# Patient Record
Sex: MALE | Race: WHITE
[De-identification: names, ages, dates, MRNs, and addresses within clinical notes are randomized per-mention and may not be internally consistent; named-entity substitution may affect disease eponyms.]

---

## 2018-01-29 ENCOUNTER — HOSPITAL ENCOUNTER (OUTPATIENT)
Dept: HOSPITAL 47 - EC | Age: 48
Setting detail: OBSERVATION
LOS: 1 days | Discharge: HOME | End: 2018-01-30
Attending: HOSPITALIST | Admitting: HOSPITALIST
Payer: COMMERCIAL

## 2018-01-29 VITALS — BODY MASS INDEX: 23.7 KG/M2

## 2018-01-29 VITALS — RESPIRATION RATE: 18 BRPM

## 2018-01-29 DIAGNOSIS — F17.200: ICD-10-CM

## 2018-01-29 DIAGNOSIS — R03.0: ICD-10-CM

## 2018-01-29 DIAGNOSIS — R06.00: ICD-10-CM

## 2018-01-29 DIAGNOSIS — R42: ICD-10-CM

## 2018-01-29 DIAGNOSIS — R00.2: Primary | ICD-10-CM

## 2018-01-29 DIAGNOSIS — Z88.0: ICD-10-CM

## 2018-01-29 DIAGNOSIS — R55: ICD-10-CM

## 2018-01-29 LAB
ALBUMIN SERPL-MCNC: 4.1 G/DL (ref 3.5–5)
ALP SERPL-CCNC: 54 U/L (ref 38–126)
ALT SERPL-CCNC: 28 U/L (ref 21–72)
ANION GAP SERPL CALC-SCNC: 10 MMOL/L
APTT BLD: 26.8 SEC (ref 22–30)
AST SERPL-CCNC: 18 U/L (ref 17–59)
BASOPHILS # BLD AUTO: 0 K/UL (ref 0–0.2)
BASOPHILS NFR BLD AUTO: 1 %
BUN SERPL-SCNC: 22 MG/DL (ref 9–20)
CALCIUM SPEC-MCNC: 10.1 MG/DL (ref 8.4–10.2)
CHLORIDE SERPL-SCNC: 105 MMOL/L (ref 98–107)
CO2 SERPL-SCNC: 27 MMOL/L (ref 22–30)
EOSINOPHIL # BLD AUTO: 0.1 K/UL (ref 0–0.7)
EOSINOPHIL NFR BLD AUTO: 1 %
ERYTHROCYTE [DISTWIDTH] IN BLOOD BY AUTOMATED COUNT: 4.94 M/UL (ref 4.3–5.9)
ERYTHROCYTE [DISTWIDTH] IN BLOOD: 13.8 % (ref 11.5–15.5)
GLUCOSE SERPL-MCNC: 84 MG/DL (ref 74–99)
HCT VFR BLD AUTO: 44.8 % (ref 39–53)
HGB BLD-MCNC: 14.6 GM/DL (ref 13–17.5)
INR PPP: 1.1 (ref ?–1.2)
LYMPHOCYTES # SPEC AUTO: 2.1 K/UL (ref 1–4.8)
LYMPHOCYTES NFR SPEC AUTO: 26 %
MCH RBC QN AUTO: 29.6 PG (ref 25–35)
MCHC RBC AUTO-ENTMCNC: 32.6 G/DL (ref 31–37)
MCV RBC AUTO: 90.7 FL (ref 80–100)
MONOCYTES # BLD AUTO: 0.6 K/UL (ref 0–1)
MONOCYTES NFR BLD AUTO: 7 %
NEUTROPHILS # BLD AUTO: 5 K/UL (ref 1.3–7.7)
NEUTROPHILS NFR BLD AUTO: 63 %
PLATELET # BLD AUTO: 273 K/UL (ref 150–450)
POTASSIUM SERPL-SCNC: 3.9 MMOL/L (ref 3.5–5.1)
PROT SERPL-MCNC: 6.9 G/DL (ref 6.3–8.2)
PT BLD: 10.4 SEC (ref 9–12)
SODIUM SERPL-SCNC: 142 MMOL/L (ref 137–145)
WBC # BLD AUTO: 7.8 K/UL (ref 3.8–10.6)

## 2018-01-29 PROCEDURE — 99285 EMERGENCY DEPT VISIT HI MDM: CPT

## 2018-01-29 PROCEDURE — 82550 ASSAY OF CK (CPK): CPT

## 2018-01-29 PROCEDURE — 85610 PROTHROMBIN TIME: CPT

## 2018-01-29 PROCEDURE — 85730 THROMBOPLASTIN TIME PARTIAL: CPT

## 2018-01-29 PROCEDURE — 96361 HYDRATE IV INFUSION ADD-ON: CPT

## 2018-01-29 PROCEDURE — 96374 THER/PROPH/DIAG INJ IV PUSH: CPT

## 2018-01-29 PROCEDURE — 82553 CREATINE MB FRACTION: CPT

## 2018-01-29 PROCEDURE — 36415 COLL VENOUS BLD VENIPUNCTURE: CPT

## 2018-01-29 PROCEDURE — 85025 COMPLETE CBC W/AUTO DIFF WBC: CPT

## 2018-01-29 PROCEDURE — 71046 X-RAY EXAM CHEST 2 VIEWS: CPT

## 2018-01-29 PROCEDURE — 93017 CV STRESS TEST TRACING ONLY: CPT

## 2018-01-29 PROCEDURE — 96375 TX/PRO/DX INJ NEW DRUG ADDON: CPT

## 2018-01-29 PROCEDURE — 93005 ELECTROCARDIOGRAM TRACING: CPT

## 2018-01-29 PROCEDURE — 80061 LIPID PANEL: CPT

## 2018-01-29 PROCEDURE — 84484 ASSAY OF TROPONIN QUANT: CPT

## 2018-01-29 PROCEDURE — 93306 TTE W/DOPPLER COMPLETE: CPT

## 2018-01-29 PROCEDURE — 80053 COMPREHEN METABOLIC PANEL: CPT

## 2018-01-29 RX ADMIN — METOPROLOL TARTRATE SCH MG: 25 TABLET, FILM COATED ORAL at 22:28

## 2018-01-29 RX ADMIN — NICOTINE SCH PATCH: 21 PATCH, EXTENDED RELEASE TRANSDERMAL at 22:28

## 2018-01-29 RX ADMIN — NITROGLYCERIN SCH: 20 OINTMENT TOPICAL at 22:29

## 2018-01-29 RX ADMIN — FAMOTIDINE SCH MG: 10 INJECTION, SOLUTION INTRAVENOUS at 22:28

## 2018-01-29 NOTE — ED
General Adult HPI





- General


Chief complaint: Dizziness


Stated complaint: Chest Pain.


Time Seen by Provider: 01/29/18 18:09


Source: patient


Mode of arrival: EMS


Limitations: no limitations





- History of Present Illness


Initial comments: 





This 48-year-old male presents with a complaint of feeling lightheaded.  This 

occurred approximately 2 and half hours prior to arrival.  He states that he 

had some palpitations and he felt presyncopal at points.  This seemed to be 

worse when he was standing.  He also had some shortness of breath but denies 

any chest pain.  He states that he had numbness and tingling in his bilateral 

arms.  He denies any chest tightness, chest burning, or chest pain whatsoever.  

He denies any abnormal sensations to his chest and this is in contrast to the 

nursing Notes.  He denies any previous cardiac or pulmonary problems.  He also 

states that he felt somewhat dizzy last evening while he was cleaning his 

baseboards.  No other identifiable complaints or modifying factors.  There is 

no leg pain or swelling or history of DVT or PE.  He denies any history of 

anxiety reactions or panic attacks.  He denies any current significant stress.





- Related Data


 Home Medications











 Medication  Instructions  Recorded  Confirmed


 


Ibuprofen [Motrin Ib] 400 mg PO Q6HR PRN 01/29/18 01/29/18











 Allergies











Allergy/AdvReac Type Severity Reaction Status Date / Time


 


Penicillins Allergy  Swelling Verified 01/29/18 18:33














Review of Systems


ROS Statement: 


Those systems with pertinent positive or pertinent negative responses have been 

documented in the HPI.





ROS Other: All systems not noted in ROS Statement are negative.





Past Medical History


Past Medical History: No Reported History


History of Any Multi-Drug Resistant Organisms: None Reported


Past Surgical History: Cholecystectomy


Additional Past Surgical History / Comment(s): vasectomy


Past Psychological History: No Psychological Hx Reported


Smoking Status: Current every day smoker


Past Alcohol Use History: Occasional


Past Drug Use History: None Reported





General Exam





- General Exam Comments


Initial Comments: 





GENERAL: The patient is well nourished and well hydrated. 


VITAL SIGNS: Heart rate, blood pressure, respiratory rate reviewed as recorded 

in nurse's notes. 


EYES: Pupils are round and reactive. Extraocular movements are intact. No 

conjunctival / lid redness or swelling. 


ENT: No external evidence of injury, swelling, or ecchymosis. Airway is patent. 

Throat is clear. 


NECK: Nontender. No swelling or evidence of injury. No subcutaneous emphysema. 

Trachea is midline. No thyroid mass. 


HEART: Regular rate and rhythm. Good peripheral pulses. 


LUNGS/CHEST: Breath sounds clear and equal bilaterally. No rales, rhonchi, or 

wheezes. No ecchymosis, subcutaneous emphysema, or tenderness. 


ABDOMEN: Abdomen soft without tenderness. No palpable masses or organomegaly. 

No peritoneal signs. No abdominal wall swelling or ecchymosis. 


EXTREMITIES: No extremity tenderness. Normal muscle tone and function. No 

thoracolumbar tenderness. 


NEUROLOGIC: Sensation is grossly intact. Cranial nerve exam reveals face is 

symmetrical, tongue is midline, speech is clear. 


SKIN: No abrasions or ecchymosis is noted. No induration or masses noted. 


PSYCHIATRIC: Alert and oriented. Appropriate behavior and judgment.





Limitations: no limitations





Course


 Vital Signs











  01/29/18 01/29/18 01/29/18





  17:59 18:10 19:20


 


Temperature 98.1 F  


 


Pulse Rate 88  74


 


Pulse Rate [  78 





Sitting]   


 


Pulse Rate [  90 





Standing]   


 


Pulse Rate [  73 





Supine]   


 


Respiratory 20  18





Rate   


 


Blood Pressure 166/93  142/80


 


Blood Pressure  153/90 





[Sitting]   


 


Blood Pressure  145/87 





[Supine]   


 


O2 Sat by Pulse 97  98





Oximetry   














Medical Decision Making





- Medical Decision Making





The patient was seen and examined.  All diagnostics were reviewed.  The EKG 

shows a normal sinus rhythm at a rate of 74.  There is no acute ST-T wave 

changes noted.  The GA intervals 184, QRS duration is 90, and the QTC intervals 

421.  The patient did receive some Ativan intravenously.  He later receives 

some aspirin as well as some Nitropaste for possible dyspnea related anginal 

equivalent.  He also had a chest x-ray done which does not show any acute 

processes.  The laboratory thus far has been fairly unremarkable.  Possibility 

of this being related to acute coronary syndrome is possible.  The possibility 

of a heart arrhythmia is possible as well.  No arrhythmias noted less far in 

the ER.  He states that he was unable to ambulate.  It is felt as though he 

benefit from admission and further workup.  He is agreeable.  Case was 

discussed with internal medicine and they are agreeable with admission.





- Lab Data


Result diagrams: 


 01/29/18 18:06


 Lab Results











  01/29/18 01/29/18 01/29/18 Range/Units





  18:06 18:06 18:06 


 


PT   10.4   (9.0-12.0)  sec


 


INR   1.1   (<1.2)  


 


APTT   26.8   (22.0-30.0)  sec


 


Sodium  142    (137-145)  mmol/L


 


Potassium  3.9    (3.5-5.1)  mmol/L


 


Chloride  105    ()  mmol/L


 


Carbon Dioxide  27    (22-30)  mmol/L


 


Anion Gap  10    mmol/L


 


BUN  22 H    (9-20)  mg/dL


 


Creatinine  0.97    (0.66-1.25)  mg/dL


 


Est GFR (MDRD) Af Amer  >60    (>60 ml/min/1.73 sqM)  


 


Est GFR (MDRD) Non-Af  >60    (>60 ml/min/1.73 sqM)  


 


Glucose  84    (74-99)  mg/dL


 


Calcium  10.1    (8.4-10.2)  mg/dL


 


Total Bilirubin  0.5    (0.2-1.3)  mg/dL


 


AST  18    (17-59)  U/L


 


ALT  28    (21-72)  U/L


 


Alkaline Phosphatase  54    ()  U/L


 


Troponin I    <0.012  (0.000-0.034)  ng/mL


 


Total Protein  6.9    (6.3-8.2)  g/dL


 


Albumin  4.1    (3.5-5.0)  g/dL














Disposition


Clinical Impression: 


 Weakness, Dyspnea, Hypertension, Palpitations, Dizziness





Disposition: ADMITTED AS IP TO THIS [facility]


Condition: Fair


Time of Disposition: 20:04


Decision Date: 01/29/18


Decision Time: 20:04

## 2018-01-30 VITALS — TEMPERATURE: 97.6 F | DIASTOLIC BLOOD PRESSURE: 71 MMHG | HEART RATE: 72 BPM | SYSTOLIC BLOOD PRESSURE: 109 MMHG

## 2018-01-30 LAB
CHOLEST SERPL-MCNC: 151 MG/DL (ref ?–200)
CK SERPL-CCNC: 104 U/L (ref 55–170)
CK SERPL-CCNC: 119 U/L (ref 55–170)
HDLC SERPL-MCNC: 35 MG/DL (ref 40–60)
LDLC SERPL CALC-MCNC: 94 MG/DL (ref 0–99)
TRIGL SERPL-MCNC: 112 MG/DL (ref ?–150)
TROPONIN I SERPL-MCNC: <0.012 NG/ML (ref 0–0.03)
TROPONIN I SERPL-MCNC: <0.012 NG/ML (ref 0–0.03)

## 2018-01-30 RX ADMIN — METOPROLOL TARTRATE SCH: 25 TABLET, FILM COATED ORAL at 13:03

## 2018-01-30 RX ADMIN — NITROGLYCERIN SCH: 20 OINTMENT TOPICAL at 05:32

## 2018-01-30 RX ADMIN — NICOTINE SCH: 21 PATCH, EXTENDED RELEASE TRANSDERMAL at 13:02

## 2018-01-30 RX ADMIN — FAMOTIDINE SCH: 10 INJECTION, SOLUTION INTRAVENOUS at 13:03

## 2018-01-30 NOTE — EST
EXERCISE STRESS



DATE OF SERVICE:

01/30/2018



AGE:

48



SEX:

Male



HT:

6'2"



WT:

185



PROTOCOL:

Guido.



STAGE:

III



DURATION OF EXERCISE:

11 minutes



HEART RATE REST:

64



BLOOD PRESSURE REST:

111/76



MAXIMUM HEART RATE ACHIEVED:

148



MAXIMUM BLOOD PRESSURE:

195/78



85% MPHR:

145



100% MPHR:

170



METS:

12.1



INDICATION OF STUDY:

Chest pain.



CLINICAL INFORMATION:



STRESS DATA:  Pretesting physical examination showed heart rate of 64, pressure is

111/76 mmHg.  Baseline EKG shows sinus mechanism.  The patient exercised on the

treadmill according to Guido protocol for a total of 11 minute and achieved 12.1 METs.

Max heart rate was 148, which is about 87% of maximum predicted heart rate.  Maximum

blood pressure was 195/78 mmHg. Clinically the patient did not have any symptoms of

chest pain or discomfort but he developed some shortness of breath with exertion.  The

EKG showed about 0.5 mm horizontal ST-segment depression did not meet the criteria for

ischemia.



CONCLUSION:

1. Excellent exercise capacity.

2. Mild EKG changes in response to exercise, did not meet the criteria for ischemia.





MMODL / IJN: [id number] / Job#: [id number]

## 2018-01-30 NOTE — ECHOF
Referral Reason:sob



MEASUREMENTS

--------

HEIGHT: 182.9 cm

WEIGHT: 83.5 kg

BP: 103/56

IVSd:   1.2 cm     (0.6 - 1.1)

LVIDd:   3.7 cm     (3.9 - 5.3)

LVPWd:   1.3 cm     (0.6 - 1.1)

IVSs:   1.5 cm

LVIDs:   2.5 cm

LVPWs:   1.7 cm

Ao Diam:   3.5 cm     (2.0 - 3.7)

AV Cusp:   1.8 cm     (1.5 - 2.6)

LA Diam:   3.6 cm     (2.7 - 3.8)

MV EXCURSION:   25.336 mm     (> 18.000)

MV EF SLOPE:   141 mm/s     (70 - 150)

EPSS:   1.6 cm

MV E Bradley:   0.71 m/s

MV DecT:   203 ms

MV A Bradley:   0.56 m/s

MV E/A Ratio:   1.27 

RAP:   5.00 mmHg

RVSP:   20.65 mmHg







FINDINGS

--------

Sinus rhythm with extra systolic beats.

This was a technically good study.

The left ventricular size is normal.   There is mild concentric left ventricular hypertrophy.   Overa
ll left ventricular systolic function is normal with, an EF between 55 - 60 %.

The right ventricle is normal in size and function.

The left atrium is normal in size.

The right atrium is normal in size.

The aortic valve is trileaflet, and appears structurally normal. No aortic stenosis or regurgitation.


The mitral valve leaflets are mildly thickened.   Mild mitral regurgitation is present.

Mild tricuspid regurgitation present.   The right ventricular systolic pressure, as measured by Doppl
er, is 20.65mmHg.

Pulmonic valve appears structurally normal.

The aortic root size is normal.

Normal inferior vena cava with normal inspiratory collapse consistent with estimated right atrial pre
ssure of  5 mmHg.

The pericardium is normal.



CONCLUSIONS

--------

1. Sinus rhythm with extra systolic beats.

2. This was a technically good study.

3. The left ventricular size is normal.

4. There is mild concentric left ventricular hypertrophy.

5. Overall left ventricular systolic function is normal with, an EF between 55 - 60 %.

6. The right ventricle is normal in size and function.

7. The left atrium is normal in size.

8. The right atrium is normal in size.

9. The aortic valve is trileaflet, and appears structurally normal. No aortic stenosis or regurgitati
on.

10. The mitral valve leaflets are mildly thickened.

11. Mild mitral regurgitation is present.

12. Mild tricuspid regurgitation present.

13. The right ventricular systolic pressure, as measured by Doppler, is 20.65mmHg.

14. Pulmonic valve appears structurally normal.

15. The aortic root size is normal.

16. Normal inferior vena cava with normal inspiratory collapse consistent with estimated right atrial
 pressure of  5 mmHg.

17. The pericardium is normal.





SONOGRAPHER: Susan Shah RDCS

## 2018-01-30 NOTE — P.DS
Providers


Date of admission: 


01/29/18 20:18





Attending physician: 


Yakov Early





Consults: 





 





01/30/18 08:02


Consult Physician Routine 


   Consulting Provider: Arie Mark


   Consult Reason/Comments: palpitations


   Do you want consulting provider notified?: Yes











Primary care physician: 


Alvaro Kimble





[facility] Course: 


please refer to my HPI





Patient Condition at Discharge: Fair





Plan - Discharge Summary


New Discharge Prescriptions: 


New


   Nicotine 21Mg/24Hr Patch [Habitrol] 1 each TRANSDERM DAILY #14 patch





No Action


   Ibuprofen [Motrin Ib] 400 mg PO Q6HR PRN


     PRN Reason: Pain


Discharge Medication List





Ibuprofen [Motrin Ib] 400 mg PO Q6HR PRN 01/29/18 [History]


Nicotine 21Mg/24Hr Patch [Habitrol] 1 each TRANSDERM DAILY #14 patch 01/30/18 [

Rx]








Follow up Appointment(s)/Referral(s): 


Shyanne John MD [Primary Care Provider] - 3 Days


Arie Mark MD [STAFF PHYSICIAN] - 1 Week (Follow up appointment is February 20th at 3:45pm)


Patient Instructions/Handouts:  Palpitations (GEN), Syncope (GEN)


Discharge Disposition: HOME SELF-CARE

## 2018-01-30 NOTE — P.CRDCN
History of Present Illness


Consult date: 01/30/18


Chief complaint: Palpitation


History of present illness: 





This is a pleasant 48-year-old gentleman with no significant past medical 

history presented to the hospital complaining of palpitations.  The patient has 

been experiencing palpitations for long time but for the last 24 hours it was 

more prominent and was constant in the chest.  He did not have any symptoms of 

chest pain or discomfort with it but he felt dizzy and lightheaded and almost 

passing out.  He is not aware of any prior history of coronary artery disease 

or congestive heart failure or cardiac arrhythmia and never seen any 

cardiologist in the past.  Beside that he does not have any diabetes or 

hypertension or dyslipidemia.  The patient does smoke and has no family history 

of coronary artery disease.





The EKG showed sinus rhythm without any significant ST or T-wave abnormalities.

  The cardiac enzymes were checked and came in to be unremarkable.  The chest x-

ray did not show any acute abnormalities.





Past Medical History


Past Medical History: No Reported History


History of Any Multi-Drug Resistant Organisms: None Reported


Past Surgical History: Cholecystectomy


Additional Past Surgical History / Comment(s): vasectomy


Past Anesthesia/Blood Transfusion Reactions: No Reported Reaction


Smoking Status: Current every day smoker





- Past Family History


  ** Father


Additional Family Medical History / Comment(s): heart issues with blood vessels.





  ** Mother


Family Medical History: No Reported History





Medications and Allergies


 Home Medications











 Medication  Instructions  Recorded  Confirmed  Type


 


Ibuprofen [Motrin Ib] 400 mg PO Q6HR PRN 01/29/18 01/29/18 History











 Allergies











Allergy/AdvReac Type Severity Reaction Status Date / Time


 


Penicillins Allergy  Swelling Verified 01/29/18 21:06














Physical Exam


Vitals: 


 Vital Signs











  Temp Pulse Pulse Pulse Pulse Pulse Resp


 


 01/30/18 08:00  98.0 F   55 L     18


 


 01/30/18 04:00        18


 


 01/30/18 03:41  98 F      60  18


 


 01/30/18 00:00        18


 


 01/29/18 22:30     77  81  73  18


 


 01/29/18 21:10        18


 


 01/29/18 21:04  97.4 F L      67  18


 


 01/29/18 20:44  97.8 F  73      18


 


 01/29/18 19:20   74      18


 


 01/29/18 18:10     78  90  73 


 


 01/29/18 17:59  98.1 F  88      20














  BP BP BP BP BP Pulse Ox


 


 01/30/18 08:00   93/51     96


 


 01/30/18 04:00      


 


 01/30/18 03:41      103/56  94 L


 


 01/30/18 00:00      


 


 01/29/18 22:30    118/72  123/74  129/76  98


 


 01/29/18 21:10      


 


 01/29/18 21:04      143/85  97


 


 01/29/18 20:44  157/94      97


 


 01/29/18 19:20  142/80      98


 


 01/29/18 18:10    153/90   145/87 


 


 01/29/18 17:59  166/93      97








 Intake and Output











 01/29/18 01/30/18 01/30/18





 22:59 06:59 14:59


 


Intake Total 250  


 


Balance 250  


 


Intake:   


 


  Oral 250  


 


Other:   


 


  # Voids  1 


 


  Weight 83.9 kg  














- Constitutional


General appearance: no acute distress





- Respiratory


Respiratory: bilateral: CTA





- Cardiovascular


Rhythm: regular


Heart sounds: normal: S1, S2





Results





 01/29/18 18:06





 01/29/18 18:06


 Cardiac Enzymes











  01/29/18 01/29/18 01/30/18 Range/Units





  18:06 18:06 01:10 


 


AST  18    (17-59)  U/L


 


CK-MB (CK-2)    1.0  (0.0-2.4)  ng/mL


 


Troponin I   <0.012  <0.012  (0.000-0.034)  ng/mL














  01/30/18 Range/Units





  06:23 


 


AST   (17-59)  U/L


 


CK-MB (CK-2)  0.9  (0.0-2.4)  ng/mL


 


Troponin I  <0.012  (0.000-0.034)  ng/mL








 Coagulation











  01/29/18 Range/Units





  18:06 


 


PT  10.4  (9.0-12.0)  sec


 


APTT  26.8  (22.0-30.0)  sec








 Lipids











  01/30/18 Range/Units





  06:23 


 


Triglycerides  112  (<150)  mg/dL


 


Cholesterol  151  (<200)  mg/dL


 


HDL Cholesterol  35 L  (40-60)  mg/dL








 CBC











  01/29/18 Range/Units





  18:06 


 


WBC  7.8  (3.8-10.6)  k/uL


 


RBC  4.94  (4.30-5.90)  m/uL


 


Hgb  14.6  (13.0-17.5)  gm/dL


 


Hct  44.8  (39.0-53.0)  %


 


Plt Count  273  (150-450)  k/uL








 Comprehensive Metabolic Panel











  01/29/18 Range/Units





  18:06 


 


Sodium  142  (137-145)  mmol/L


 


Potassium  3.9  (3.5-5.1)  mmol/L


 


Chloride  105  ()  mmol/L


 


Carbon Dioxide  27  (22-30)  mmol/L


 


BUN  22 H  (9-20)  mg/dL


 


Creatinine  0.97  (0.66-1.25)  mg/dL


 


Glucose  84  (74-99)  mg/dL


 


Calcium  10.1  (8.4-10.2)  mg/dL


 


AST  18  (17-59)  U/L


 


ALT  28  (21-72)  U/L


 


Alkaline Phosphatase  54  ()  U/L


 


Total Protein  6.9  (6.3-8.2)  g/dL


 


Albumin  4.1  (3.5-5.0)  g/dL








 Current Medications











Generic Name Dose Route Start Last Admin





  Trade Name Freq  PRN Reason Stop Dose Admin


 


Aspirin  325 mg  01/30/18 09:00  





  Aspirin  PO   





  DAILY [facility]   


 


Enoxaparin Sodium  40 mg  01/30/18 09:00  





  Lovenox  SQ   





  DAILY [facility]   


 


Famotidine  20 mg  01/29/18 21:00  01/29/18 22:28





  Pepcid  IV   20 mg





  Q12HR [facility]   Administration


 


Ibuprofen  400 mg  01/29/18 20:21  





  Motrin  PO   





  Q6HR PRN   





  Pain   


 


Metoprolol Tartrate  25 mg  01/29/18 21:00  01/29/18 22:28





  Lopressor  PO   25 mg





  BID [facility]   Administration


 


Nicotine  1 patch  01/29/18 21:30  01/29/18 22:28





  Habitrol 21mg/24hr Patch  TRANSDERM   1 patch





  DAILY [facility]   Administration


 


Nitroglycerin  1 inch  01/30/18 00:00  01/30/18 05:32





  Nitro-Bid Oint  TOPICAL   Not Given





  Q6HR [facility]   


 


Nitroglycerin  0.4 mg  01/29/18 20:18  





  Nitrostat  SUBLINGUAL   





  Q5M PRN   





  Chest Pain   








 Intake and Output











 01/29/18 01/30/18 01/30/18





 22:59 06:59 14:59


 


Intake Total 250  


 


Balance 250  


 


Intake:   


 


  Oral 250  


 


Other:   


 


  # Voids  1 


 


  Weight 83.9 kg  








 





 01/29/18 18:06 





 01/29/18 18:06 











Assessment and Plan


Assessment: 





Assessment


#1 palpitations


#2 presyncope





Plan


#1 the patient was ruled out for acute coronary event


#2 follow-up with the echocardiogram


#3 obtain a treadmill exercise stress test


#4 follow-up with the patient.





Thank you for allowing us participate in his care

## 2020-01-24 ENCOUNTER — HOSPITAL ENCOUNTER (EMERGENCY)
Dept: HOSPITAL 47 - EC | Age: 50
Discharge: HOME | End: 2020-01-24
Payer: COMMERCIAL

## 2020-01-24 VITALS
SYSTOLIC BLOOD PRESSURE: 169 MMHG | DIASTOLIC BLOOD PRESSURE: 105 MMHG | HEART RATE: 87 BPM | RESPIRATION RATE: 20 BRPM | TEMPERATURE: 98.3 F

## 2020-01-24 DIAGNOSIS — S92.311A: Primary | ICD-10-CM

## 2020-01-24 DIAGNOSIS — Z53.20: ICD-10-CM
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
## 2020-01-24 DIAGNOSIS — Z87.891: ICD-10-CM

## 2020-01-24 DIAGNOSIS — Z88.0: ICD-10-CM

## 2020-01-24 PROCEDURE — 99283 EMERGENCY DEPT VISIT LOW MDM: CPT

## 2020-01-24 NOTE — ED
Lower Extremity Injury HPI





- General


Chief Complaint: Extremity Injury, Lower


Stated Complaint: R Foot Pain


Time Seen by Provider: 01/24/20 00:34


Source: patient


Mode of arrival: ambulatory


Limitations: no limitations





- History of Present Illness


Initial Comments: 





Patient is a 50-year-old male presenting to the emergency Department with 

complaints of right foot pain that started about 4 PM earlier today.  Patient 

states he had on steel toe boots when he went to kick something off his boot and

kicks Hi-Lo machine.  Patient states the toe of the boot rolled upwards and he's

been having pain in his right big toe and his distal foot since.  There is mild 

swelling and bruising of the right big toe.  He denies any other complaints 

today.  He has no previous history of right foot surgeries or injuries.  Upon 

arrival to ER, his vital signs are stable.





- Related Data


                                Home Medications











 Medication  Instructions  Recorded  Confirmed


 


Ibuprofen [Motrin Ib] 400 mg PO Q6HR PRN 01/29/18 01/29/18








                                  Previous Rx's











 Medication  Instructions  Recorded


 


Nicotine 21Mg/24Hr Patch [Habitrol] 1 each TRANSDERM DAILY #14 patch 01/30/18











                                    Allergies











Allergy/AdvReac Type Severity Reaction Status Date / Time


 


Penicillins Allergy  Swelling Verified 01/24/20 00:32














Review of Systems


ROS Statement: 


Those systems with pertinent positive or pertinent negative responses have been 

documented in the HPI.





ROS Other: All systems not noted in ROS Statement are negative.





Past Medical History


Past Medical History: No Reported History


History of Any Multi-Drug Resistant Organisms: None Reported


Past Surgical History: Cholecystectomy


Additional Past Surgical History / Comment(s): vasectomy


Past Anesthesia/Blood Transfusion Reactions: No Reported Reaction


Past Psychological History: No Psychological Hx Reported


Smoking Status: Former smoker


Past Alcohol Use History: Occasional


Past Drug Use History: None Reported





- Past Family History


  ** Father


Additional Family Medical History / Comment(s): heart issues with blood vessels.





  ** Mother


Family Medical History: No Reported History





General Exam





- General Exam Comments


Initial Comments: 





GENERAL: 


Well-appearing, well-nourished and in no acute distress.





HEAD: 


Atraumatic, normocephalic.





EYES:


Pupils equal round and reactive to light, extraocular movements intact, sclera 

anicteric, conjunctiva are normal.





ENT: 


Moist mucous membranes.





NECK: 


Normal range of motion, supple without lymphadenopathy or JVD.





LUNGS:


 Breath sounds clear to auscultation bilaterally and equal.  No wheezes rales or

 rhonchi.





HEART:


Regular rate and rhythm without murmurs, rubs or gallops.





EXTREMITIES: 


Pain with palpation of the medial aspect of the right big toe, patient has 

decreased range of motion of the right toes secondary to pain.  There is some 

mild swelling and bruising along the medial aspect.  Patient is neurovascular 

intact.  There is no pain in the ankle or distal leg.  





PSYCH:


Normal mood, normal affect.





SKIN:


 Warm, Dry, normal turgor, no rashes or lesions noted.


Limitations: no limitations





Course


                                   Vital Signs











  01/24/20





  00:30


 


Temperature 98.3 F


 


Pulse Rate 87


 


Respiratory 20





Rate 


 


Blood Pressure 169/105


 


O2 Sat by Pulse 96





Oximetry 














Medical Decision Making





- Medical Decision Making





Patient is a 50-year-old male presenting of right foot pain after he kicked a 

high-Lo.  X-rays of the right foot show a small chip fracture of the big toe at 

the first MPJ.  No significant displacement, no other fractures.  I discussed 

these findings with the patient.  Recommended patient to wear a surgical 

Boomhower patient refused.  Patient will wear supportive shoes.  He will follow 

up with PCP and 1-2 weeks if symptoms aren't improving.  He still for discharge 

at this time and he is in agreement with this plan of care.





Disposition


Clinical Impression: 


 Fracture of first metatarsal bone of right foot





Disposition: HOME SELF-CARE


Condition: Stable


Instructions (If sedation given, give patient instructions):  Toe Fracture (ED)


Additional Instructions: 


Please return to the Emergency Department if symptoms worsen or any other 

concerns.


Use ice on the area as well as Motrin for pain relief.  Wear supportive shoe.  

Follow-up with PCP in 2-3 weeks if symptoms are persisting.


Is patient prescribed a controlled substance at d/c from ED?: No


Referrals: 


Shyanne John MD [Primary Care Provider] - 1-2 days

## 2020-01-24 NOTE — XR
EXAMINATION TYPE: XR foot complete RT

 

DATE OF EXAM: 1/24/2020

 

COMPARISON: NONE

 

HISTORY: Foot pain

 

TECHNIQUE: 3 views

 

FINDINGS: Metatarsals are intact. There is a small 4 mm chip fracture of the medial base of the proxi
mal phalanx of the big toe. Fractures probably acute.

 

IMPRESSION: Small chip fracture of the big toe at the first MP joint as above. No dislocation.

## 2020-05-23 ENCOUNTER — HOSPITAL ENCOUNTER (INPATIENT)
Dept: HOSPITAL 47 - EC | Age: 50
LOS: 4 days | Discharge: HOME | DRG: 392 | End: 2020-05-27
Attending: SURGERY | Admitting: SURGERY
Payer: COMMERCIAL

## 2020-05-23 DIAGNOSIS — Z90.49: ICD-10-CM

## 2020-05-23 DIAGNOSIS — Z11.59: ICD-10-CM

## 2020-05-23 DIAGNOSIS — Z88.0: ICD-10-CM

## 2020-05-23 DIAGNOSIS — Z98.890: ICD-10-CM

## 2020-05-23 DIAGNOSIS — K57.80: Primary | ICD-10-CM

## 2020-05-23 DIAGNOSIS — Z87.891: ICD-10-CM

## 2020-05-23 LAB
ALBUMIN SERPL-MCNC: 4.3 G/DL (ref 3.5–5)
ALP SERPL-CCNC: 63 U/L (ref 38–126)
ALT SERPL-CCNC: 23 U/L (ref 4–49)
ANION GAP SERPL CALC-SCNC: 8 MMOL/L
APTT BLD: 26.9 SEC (ref 22–30)
AST SERPL-CCNC: 19 U/L (ref 17–59)
BASOPHILS # BLD AUTO: 0.1 K/UL (ref 0–0.2)
BASOPHILS NFR BLD AUTO: 1 %
BUN SERPL-SCNC: 14 MG/DL (ref 9–20)
CALCIUM SPEC-MCNC: 9.3 MG/DL (ref 8.4–10.2)
CHLORIDE SERPL-SCNC: 107 MMOL/L (ref 98–107)
CO2 SERPL-SCNC: 22 MMOL/L (ref 22–30)
EOSINOPHIL # BLD AUTO: 0.2 K/UL (ref 0–0.7)
EOSINOPHIL NFR BLD AUTO: 2 %
ERYTHROCYTE [DISTWIDTH] IN BLOOD BY AUTOMATED COUNT: 5.42 M/UL (ref 4.3–5.9)
ERYTHROCYTE [DISTWIDTH] IN BLOOD: 12.9 % (ref 11.5–15.5)
GLUCOSE SERPL-MCNC: 119 MG/DL (ref 74–99)
HCT VFR BLD AUTO: 47.3 % (ref 39–53)
HGB BLD-MCNC: 15.5 GM/DL (ref 13–17.5)
INR PPP: 1 (ref ?–1.2)
LYMPHOCYTES # SPEC AUTO: 2 K/UL (ref 1–4.8)
LYMPHOCYTES NFR SPEC AUTO: 20 %
MCH RBC QN AUTO: 28.6 PG (ref 25–35)
MCHC RBC AUTO-ENTMCNC: 32.8 G/DL (ref 31–37)
MCV RBC AUTO: 87.3 FL (ref 80–100)
MONOCYTES # BLD AUTO: 0.6 K/UL (ref 0–1)
MONOCYTES NFR BLD AUTO: 6 %
NEUTROPHILS # BLD AUTO: 7 K/UL (ref 1.3–7.7)
NEUTROPHILS NFR BLD AUTO: 69 %
PH UR: 5.5 [PH] (ref 5–8)
PLATELET # BLD AUTO: 286 K/UL (ref 150–450)
POTASSIUM SERPL-SCNC: 4 MMOL/L (ref 3.5–5.1)
PROT SERPL-MCNC: 7.4 G/DL (ref 6.3–8.2)
PT BLD: 10.3 SEC (ref 9–12)
SODIUM SERPL-SCNC: 137 MMOL/L (ref 137–145)
SP GR UR: 1.04 (ref 1–1.03)
UROBILINOGEN UR QL STRIP: <2 MG/DL (ref ?–2)
WBC # BLD AUTO: 10.1 K/UL (ref 3.8–10.6)

## 2020-05-23 PROCEDURE — 83690 ASSAY OF LIPASE: CPT

## 2020-05-23 PROCEDURE — 85610 PROTHROMBIN TIME: CPT

## 2020-05-23 PROCEDURE — 80048 BASIC METABOLIC PNL TOTAL CA: CPT

## 2020-05-23 PROCEDURE — 85730 THROMBOPLASTIN TIME PARTIAL: CPT

## 2020-05-23 PROCEDURE — 99285 EMERGENCY DEPT VISIT HI MDM: CPT

## 2020-05-23 PROCEDURE — 96375 TX/PRO/DX INJ NEW DRUG ADDON: CPT

## 2020-05-23 PROCEDURE — 87040 BLOOD CULTURE FOR BACTERIA: CPT

## 2020-05-23 PROCEDURE — 81003 URINALYSIS AUTO W/O SCOPE: CPT

## 2020-05-23 PROCEDURE — 83605 ASSAY OF LACTIC ACID: CPT

## 2020-05-23 PROCEDURE — 74177 CT ABD & PELVIS W/CONTRAST: CPT

## 2020-05-23 PROCEDURE — 36415 COLL VENOUS BLD VENIPUNCTURE: CPT

## 2020-05-23 PROCEDURE — 80053 COMPREHEN METABOLIC PANEL: CPT

## 2020-05-23 PROCEDURE — 96374 THER/PROPH/DIAG INJ IV PUSH: CPT

## 2020-05-23 PROCEDURE — 85025 COMPLETE CBC W/AUTO DIFF WBC: CPT

## 2020-05-23 PROCEDURE — 87635 SARS-COV-2 COVID-19 AMP PRB: CPT

## 2020-05-23 PROCEDURE — 93005 ELECTROCARDIOGRAM TRACING: CPT

## 2020-05-23 RX ADMIN — CEFAZOLIN SCH MLS/HR: 330 INJECTION, POWDER, FOR SOLUTION INTRAMUSCULAR; INTRAVENOUS at 11:41

## 2020-05-23 RX ADMIN — CEFEPIME HYDROCHLORIDE SCH MLS/HR: 2 INJECTION, POWDER, FOR SOLUTION INTRAVENOUS at 21:43

## 2020-05-23 RX ADMIN — METRONIDAZOLE SCH MLS/HR: 500 INJECTION, SOLUTION INTRAVENOUS at 20:07

## 2020-05-23 RX ADMIN — HYDROCODONE BITARTRATE AND ACETAMINOPHEN PRN EACH: 5; 325 TABLET ORAL at 21:43

## 2020-05-23 NOTE — CT
EXAMINATION TYPE: CT abdomen pelvis w con

 

DATE OF EXAM: 5/23/2020

 

COMPARISON: None

 

HISTORY: Abd pain

 

CT DLP: 1139.1 mGycm

Automated exposure control for dose reduction was used.

 

TECHNIQUE:  Helical acquisition of images from the lung bases through the pelvis have been completed.


 

CONTRAST: 

Performed without Oral Contrast and with IV Contrast, patient injected with 100 mL of Isovue 300.

 

FINDINGS: Umbilical hernia contains fat.

 

LUNG BASES: Some dependent atelectatic changes are present.

 

AORTA:  No significant abnormality is appreciated.

 

LIVER/GB: Liver shows low attenuation likely due to hepatic steatosis and liver is enlarged. Patient 
is post cholecystectomy..

 

PANCREAS: No significant abnormality is seen.

 

SPLEEN: No significant abnormality is seen.

 

ADRENALS: No significant abnormality is seen.

 

KIDNEYS: No significant abnormality is seen.

 

 

REPRODUCTIVE ORGANS: Prostate is enlarged. There is associated calcification.

 

BOWEL:  There is abnormal thickening of the colon at the level of the hepatic flexure and portions of
 the transverse colon and ascending colon. Within the surrounding fat there is increased attenuation 
present. Right glass density present in the pericolonic fat at this level.

 

FREE AIR:  No Free Air visible.

 

ASCITES:  None visible.

 

PELVIC ADENOPATHY:  None visualized.

 

RETROPERITONEAL ADENOPATHY:  No Retroperitoneal Adenopathy visible.

 

URINARY BLADDER:  No significant abnormality is seen.

 

OSSEOUS STRUCTURES:  There are degenerative disc changes especially L5-S1. At the right sacroiliac isabelle
int inferior margin some gas lucencies suggests focal area of vacuum phenomenon.

 

IMPRESSION: 

FINDINGS MAY REPRESENT COLITIS OR DIVERTICULAR ABSCESS. FOLLOW-UP IS RECOMMENDED TO EXCLUDE AN UNDERL
NICK MASS. HEPATOMEGALY WITH HEPATIC STEATOSIS.

## 2020-05-23 NOTE — ED
Abdominal Pain HPI





- General


Chief Complaint: Abdominal Pain


Stated Complaint: abd pain


Time Seen by Provider: 05/23/20 07:55


Source: patient


Mode of arrival: ambulatory


Limitations: no limitations





- History of Present Illness


Initial Comments: 





The patient is a 50-year-old male with no past medical history presents to the 

emergency room with reported right lower quadrant abdominal pain.  Patient 

reports that it started on Wednesday and has been getting progressive worse in 

nature.  He describes it as a cramping sensation which is worse with movement.  

He denies any associated nausea or vomiting.  Denies dysuria, hematuria or 

difficulty voiding.  Denies constipation, diarrhea, melenic stools or 

hematochezia.  Patient's last bowel movement was this morning and was normal in 

color and consistency for him.  Patient continues to pass gas.  Denies any back 

or flank pain.  No abdominal trauma.  He took some Tylenol at home approximately

an hour ago however had no improvement in his symptoms.  He denies fevers or 

chills.  Patient has had a cholecystectomy.  There are no other alleviating, 

precipitating or modifying factors





- Related Data


                                Home Medications











 Medication  Instructions  Recorded  Confirmed


 


No Known Home Medications  05/23/20 05/23/20











                                    Allergies











Allergy/AdvReac Type Severity Reaction Status Date / Time


 


Penicillins Allergy  Swelling Verified 05/23/20 10:04














Review of Systems


ROS Statement: 


Those systems with pertinent positive or pertinent negative responses have been 

documented in the HPI.





ROS Other: All systems not noted in ROS Statement are negative.





Past Medical History


Past Medical History: No Reported History


History of Any Multi-Drug Resistant Organisms: None Reported


Past Surgical History: Cholecystectomy


Additional Past Surgical History / Comment(s): vasectomy


Past Anesthesia/Blood Transfusion Reactions: No Reported Reaction


Past Psychological History: No Psychological Hx Reported


Smoking Status: Former smoker


Past Alcohol Use History: Occasional


Past Drug Use History: None Reported





- Past Family History


  ** Father


Additional Family Medical History / Comment(s): heart issues with blood vessels.





  ** Mother


Family Medical History: No Reported History





General Exam


Limitations: no limitations


General appearance: alert, in no apparent distress


Head exam: Present: atraumatic, normocephalic, normal inspection


Eye exam: Present: normal appearance, PERRL, EOMI.  Absent: scleral icterus, 

conjunctival injection, periorbital swelling


ENT exam: Present: normal exam, mucous membranes moist


Neck exam: Present: normal inspection.  Absent: tenderness, meningismus, 

lymphadenopathy


Respiratory exam: Present: normal lung sounds bilaterally.  Absent: respiratory 

distress, wheezes, rales, rhonchi, stridor


Cardiovascular Exam: Present: regular rate, normal rhythm, normal heart sounds. 

Absent: systolic murmur, diastolic murmur, rubs, gallop, clicks


GI/Abdominal exam: Present: soft, tenderness (right lower quadrant), normal 

bowel sounds.  Absent: distended, guarding, rebound, rigid


Extremities exam: Present: normal inspection, full ROM, normal capillary refill.

 Absent: tenderness, pedal edema, joint swelling, calf tenderness


Back exam: Present: normal inspection


Neurological exam: Present: alert, oriented X3, CN II-XII intact


Psychiatric exam: Present: normal affect, normal mood


Skin exam: Present: warm, dry, intact, normal color.  Absent: rash





Course


                                   Vital Signs











  05/23/20 05/23/20 05/23/20





  07:55 07:57 08:57


 


Temperature 98.3 F  


 


Pulse Rate 89  74


 


Respiratory 18 20 20





Rate   


 


Blood Pressure 149/107  146/100


 


O2 Sat by Pulse 98  97





Oximetry   














  05/23/20 05/23/20 05/23/20





  09:00 10:31 10:32


 


Temperature   


 


Pulse Rate 74 77 77


 


Respiratory 20 20 20





Rate   


 


Blood Pressure 146/100 143/99 143/99


 


O2 Sat by Pulse 97  97





Oximetry   














Medical Decision Making





- Medical Decision Making





Upon arrival the patient was placed into room 4.  Thorough history and physical 

exam is performed.  Peripheral IV was established.  Patient was given 4 mg of  

morphine for pain control and 4 mg of Zofran for nausea.  X-ray studies were 

drawn.  CT of the patient's abdomen was performed which demonstrates a 

diverticular abscess versus colitis at the hepatic flexure and ascending colon. 

Patient was reevaluated and resting comfortably.  I discussed diagnosis, 

differential treatment options.  Recommend hospital admission for which patient 

did agree.  I called and discussed the case with Dr. Aguilar accepted 

admission.  Blood cultures were drawn the patient was initiated on Rocephin and 

Flagyl.  Patient is currently awaiting a bed on the floor





- Lab Data


Result diagrams: 


                                 05/24/20 07:23





                                 05/24/20 07:23


                                   Lab Results











  05/23/20 05/23/20 05/23/20 Range/Units





  08:20 08:20 08:20 


 


WBC  10.1    (3.8-10.6)  k/uL


 


RBC  5.42    (4.30-5.90)  m/uL


 


Hgb  15.5    (13.0-17.5)  gm/dL


 


Hct  47.3    (39.0-53.0)  %


 


MCV  87.3    (80.0-100.0)  fL


 


MCH  28.6    (25.0-35.0)  pg


 


MCHC  32.8    (31.0-37.0)  g/dL


 


RDW  12.9    (11.5-15.5)  %


 


Plt Count  286    (150-450)  k/uL


 


Neutrophils %  69    %


 


Lymphocytes %  20    %


 


Monocytes %  6    %


 


Eosinophils %  2    %


 


Basophils %  1    %


 


Neutrophils #  7.0    (1.3-7.7)  k/uL


 


Lymphocytes #  2.0    (1.0-4.8)  k/uL


 


Monocytes #  0.6    (0-1.0)  k/uL


 


Eosinophils #  0.2    (0-0.7)  k/uL


 


Basophils #  0.1    (0-0.2)  k/uL


 


PT   10.3   (9.0-12.0)  sec


 


INR   1.0   (<1.2)  


 


APTT   26.9   (22.0-30.0)  sec


 


Sodium    137  (137-145)  mmol/L


 


Potassium    4.0  (3.5-5.1)  mmol/L


 


Chloride    107  ()  mmol/L


 


Carbon Dioxide    22  (22-30)  mmol/L


 


Anion Gap    8  mmol/L


 


BUN    14  (9-20)  mg/dL


 


Creatinine    0.88  (0.66-1.25)  mg/dL


 


Est GFR (CKD-EPI)AfAm    >90  (>60 ml/min/1.73 sqM)  


 


Est GFR (CKD-EPI)NonAf    >90  (>60 ml/min/1.73 sqM)  


 


Glucose    119 H  (74-99)  mg/dL


 


Plasma Lactic Acid Kaden     (0.7-2.0)  mmol/L


 


Calcium    9.3  (8.4-10.2)  mg/dL


 


Total Bilirubin    0.5  (0.2-1.3)  mg/dL


 


AST    19  (17-59)  U/L


 


ALT    23  (4-49)  U/L


 


Alkaline Phosphatase    63  ()  U/L


 


Total Protein    7.4  (6.3-8.2)  g/dL


 


Albumin    4.3  (3.5-5.0)  g/dL


 


Lipase    61  ()  U/L


 


Urine Color     


 


Urine Appearance     (Clear)  


 


Urine pH     (5.0-8.0)  


 


Ur Specific Gravity     (1.001-1.035)  


 


Urine Protein     (Negative)  


 


Urine Glucose (UA)     (Negative)  


 


Urine Ketones     (Negative)  


 


Urine Blood     (Negative)  


 


Urine Nitrite     (Negative)  


 


Urine Bilirubin     (Negative)  


 


Urine Urobilinogen     (<2.0)  mg/dL


 


Ur Leukocyte Esterase     (Negative)  














  05/23/20 05/23/20 Range/Units





  08:20 09:23 


 


WBC    (3.8-10.6)  k/uL


 


RBC    (4.30-5.90)  m/uL


 


Hgb    (13.0-17.5)  gm/dL


 


Hct    (39.0-53.0)  %


 


MCV    (80.0-100.0)  fL


 


MCH    (25.0-35.0)  pg


 


MCHC    (31.0-37.0)  g/dL


 


RDW    (11.5-15.5)  %


 


Plt Count    (150-450)  k/uL


 


Neutrophils %    %


 


Lymphocytes %    %


 


Monocytes %    %


 


Eosinophils %    %


 


Basophils %    %


 


Neutrophils #    (1.3-7.7)  k/uL


 


Lymphocytes #    (1.0-4.8)  k/uL


 


Monocytes #    (0-1.0)  k/uL


 


Eosinophils #    (0-0.7)  k/uL


 


Basophils #    (0-0.2)  k/uL


 


PT    (9.0-12.0)  sec


 


INR    (<1.2)  


 


APTT    (22.0-30.0)  sec


 


Sodium    (137-145)  mmol/L


 


Potassium    (3.5-5.1)  mmol/L


 


Chloride    ()  mmol/L


 


Carbon Dioxide    (22-30)  mmol/L


 


Anion Gap    mmol/L


 


BUN    (9-20)  mg/dL


 


Creatinine    (0.66-1.25)  mg/dL


 


Est GFR (CKD-EPI)AfAm    (>60 ml/min/1.73 sqM)  


 


Est GFR (CKD-EPI)NonAf    (>60 ml/min/1.73 sqM)  


 


Glucose    (74-99)  mg/dL


 


Plasma Lactic Acid Kaden  0.9   (0.7-2.0)  mmol/L


 


Calcium    (8.4-10.2)  mg/dL


 


Total Bilirubin    (0.2-1.3)  mg/dL


 


AST    (17-59)  U/L


 


ALT    (4-49)  U/L


 


Alkaline Phosphatase    ()  U/L


 


Total Protein    (6.3-8.2)  g/dL


 


Albumin    (3.5-5.0)  g/dL


 


Lipase    ()  U/L


 


Urine Color   Yellow  


 


Urine Appearance   Clear  (Clear)  


 


Urine pH   5.5  (5.0-8.0)  


 


Ur Specific Gravity   1.043 H  (1.001-1.035)  


 


Urine Protein   Negative  (Negative)  


 


Urine Glucose (UA)   Negative  (Negative)  


 


Urine Ketones   Negative  (Negative)  


 


Urine Blood   Negative  (Negative)  


 


Urine Nitrite   Negative  (Negative)  


 


Urine Bilirubin   Negative  (Negative)  


 


Urine Urobilinogen   <2.0  (<2.0)  mg/dL


 


Ur Leukocyte Esterase   Negative  (Negative)  














- EKG Data


EKG Comments: 





EKG demonstrates normal sinus rhythm with ventricular rate 77.  CT interval 180.

 QRS 82.  QTC of 420.  No acute ST segment elevations or depressions concerning 

for ischemic changes.  Her T-wave in V5 and V6





Disposition


Clinical Impression: 


 Abdominal pain, Intestinal diverticular abscess





Disposition: ADMITTED AS IP TO THIS HOSP


Condition: Stable


Is patient prescribed a controlled substance at d/c from ED?: No


Decision to Admit Reason: Admit from EC


Decision Date: 05/23/20


Decision Time: 10:14

## 2020-05-23 NOTE — P.GSHP
History of Present Illness


H&P Date: 05/23/20


Chief Complaint: Right-sided abdominal pain





This a 50-year-old male who presents today for abdominal pain.  Patient states 

he has a three-day history of right-sided abdominal pain.  The pain worsened 

last night he came the emergency room.  His CAT scan shows evidence of possible 

diverticular abscess at the level of the proximal hepatic flexure area there is 

inflammation of the ascending and transverse colon at the hepatic flexure.  

There is questionable diverticular abscess.  Patient states his pain is a 210 

round resting in bed however when he is active the pain is a 8 out of 10





Past Medical History


Past Medical History: No Reported History


History of Any Multi-Drug Resistant Organisms: None Reported


Past Surgical History: Cholecystectomy


Additional Past Surgical History / Comment(s): vasectomy


Past Anesthesia/Blood Transfusion Reactions: No Reported Reaction


Past Psychological History: No Psychological Hx Reported


Smoking Status: Former smoker


Past Alcohol Use History: Occasional


Past Drug Use History: None Reported





- Past Family History


  ** Father


Additional Family Medical History / Comment(s): heart issues with blood vessels.





  ** Mother


Family Medical History: No Reported History





Medications and Allergies


                                Home Medications











 Medication  Instructions  Recorded  Confirmed  Type


 


No Known Home Medications  05/23/20 05/23/20 History








                                    Allergies











Allergy/AdvReac Type Severity Reaction Status Date / Time


 


Penicillins Allergy  Swelling Verified 05/23/20 10:04














Surgical - Exam


                                   Vital Signs











Temp Pulse Resp BP Pulse Ox


 


 98.3 F   89   18   149/107   98 


 


 05/23/20 07:55  05/23/20 07:55  05/23/20 07:55  05/23/20 07:55  05/23/20 07:55














- General


well developed, well nourished, no distress





- Eyes


PERRL





- ENT


normal pinna





- Neck


no masses





- Respiratory


normal expansion





- Cardiovascular


Rhythm: regular





- Abdomen





Marked tenderness on the right abdomen


Abdomen: soft





Results





- Labs





                                 05/23/20 08:20





                                 05/23/20 08:20


                  Abnormal Lab Results - Last 24 Hours (Table)











  05/23/20 05/23/20 Range/Units





  08:20 09:23 


 


Glucose  119 H   (74-99)  mg/dL


 


Ur Specific Gravity   1.043 H  (1.001-1.035)  








                                 Diabetes panel











  05/23/20 Range/Units





  08:20 


 


Sodium  137  (137-145)  mmol/L


 


Potassium  4.0  (3.5-5.1)  mmol/L


 


Chloride  107  ()  mmol/L


 


Carbon Dioxide  22  (22-30)  mmol/L


 


BUN  14  (9-20)  mg/dL


 


Creatinine  0.88  (0.66-1.25)  mg/dL


 


Glucose  119 H  (74-99)  mg/dL


 


Calcium  9.3  (8.4-10.2)  mg/dL


 


AST  19  (17-59)  U/L


 


ALT  23  (4-49)  U/L


 


Alkaline Phosphatase  63  ()  U/L


 


Total Protein  7.4  (6.3-8.2)  g/dL


 


Albumin  4.3  (3.5-5.0)  g/dL








                                  Calcium panel











  05/23/20 Range/Units





  08:20 


 


Calcium  9.3  (8.4-10.2)  mg/dL


 


Albumin  4.3  (3.5-5.0)  g/dL








                                 Pituitary panel











  05/23/20 Range/Units





  08:20 


 


Sodium  137  (137-145)  mmol/L


 


Potassium  4.0  (3.5-5.1)  mmol/L


 


Chloride  107  ()  mmol/L


 


Carbon Dioxide  22  (22-30)  mmol/L


 


BUN  14  (9-20)  mg/dL


 


Creatinine  0.88  (0.66-1.25)  mg/dL


 


Glucose  119 H  (74-99)  mg/dL


 


Calcium  9.3  (8.4-10.2)  mg/dL








                                  Adrenal panel











  05/23/20 Range/Units





  08:20 


 


Sodium  137  (137-145)  mmol/L


 


Potassium  4.0  (3.5-5.1)  mmol/L


 


Chloride  107  ()  mmol/L


 


Carbon Dioxide  22  (22-30)  mmol/L


 


BUN  14  (9-20)  mg/dL


 


Creatinine  0.88  (0.66-1.25)  mg/dL


 


Glucose  119 H  (74-99)  mg/dL


 


Calcium  9.3  (8.4-10.2)  mg/dL


 


Total Bilirubin  0.5  (0.2-1.3)  mg/dL


 


AST  19  (17-59)  U/L


 


ALT  23  (4-49)  U/L


 


Alkaline Phosphatase  63  ()  U/L


 


Total Protein  7.4  (6.3-8.2)  g/dL


 


Albumin  4.3  (3.5-5.0)  g/dL














Assessment and Plan


Assessment: 





Right-sided abdominal pain.





Possible diverticular abscess of the hepatic flexure.





Patient will continue clear liquid diet and IV antibiotic.

## 2020-05-23 NOTE — P.CONS
History of Present Illness





- Reason for Consult


Diverticular abscess





- History of Present Illness


Patient is a pleasant 50-year-old male came in with complaints of severe right 

lower quadrant abdominal pain which is crampy in sensation progressively has 

been going on for a few days denied any constipation denied any nausea vomiting.

 Patient is found to have a diverticular abscess.  Patient was evaluated and 

admitted to general surgery then not recommending any surgical intervention 

patient was started on Rocephin and metronidazole which was later switched to 

cefepime by infectious disease.  Patient denied any fever chills.  Patient 

denied any dysuria doesn't have any major medical problems.








Review of Systems








REVIEW OF SYSTEMS: 


CONSTITUTIONAL: No fever, no malaise, no fatigue. 


HEENT: No recent visual problems or hearing problems. Denied any sore throat. 


CARDIOVASCULAR: No chest pain, orthopnea, PND, no palpitations, no syncope. 


PULMONARY: No shortness of breath, no cough, no hemoptysis. 


GASTROINTESTINAL: As mentioned in HPI


NEUROLOGICAL: No headaches, no weakness, no numbness. 


HEMATOLOGICAL: Denies any bleeding or petechiae. 


GENITOURINARY: Denies any burning micturition, frequency, or urgency. 


MUSCULOSKELETAL/RHEUMATOLOGICAL: Denies any joint pain, swelling, or any muscle 

pain. 


ENDOCRINE: Denies any polyuria or polydipsia. 





The rest of the 14-point review of systems is negative.











Past Medical History


Past Medical History: No Reported History


History of Any Multi-Drug Resistant Organisms: None Reported


Past Surgical History: Cholecystectomy


Additional Past Surgical History / Comment(s): vasectomy


Past Anesthesia/Blood Transfusion Reactions: No Reported Reaction


Past Psychological History: No Psychological Hx Reported


Smoking Status: Former smoker


Past Alcohol Use History: Occasional


Past Drug Use History: None Reported





- Past Family History


  ** Father


Additional Family Medical History / Comment(s): heart issues with blood vessels.





  ** Mother


Family Medical History: No Reported History


Additional Family Medical History / Comment(s): colitis





Medications and Allergies


                                Home Medications











 Medication  Instructions  Recorded  Confirmed  Type


 


No Known Home Medications  05/23/20 05/23/20 History








                                    Allergies











Allergy/AdvReac Type Severity Reaction Status Date / Time


 


Penicillins Allergy  Swelling Verified 05/23/20 10:04














Physical Exam


Vitals: 


                                   Vital Signs











  Temp Pulse Pulse Resp BP BP Pulse Ox


 


 05/23/20 11:37  98.0 F   69  18   150/92  95


 


 05/23/20 10:32   77   20  143/99   97


 


 05/23/20 10:31   77   20  143/99  


 


 05/23/20 09:00   74   20  146/100   97


 


 05/23/20 08:57   74   20  146/100   97


 


 05/23/20 07:57     20   


 


 05/23/20 07:55  98.3 F  89   18  149/107   98








                                Intake and Output











 05/23/20 05/23/20 05/23/20





 06:59 14:59 22:59


 


Other:   


 


  Weight  95.254 kg 

















PHYSICAL EXAMINATION: 





GENERAL: The patient is alert and oriented x3, not in any acute distress. Well 

developed, well nourished. 


HEENT: Pupils are round and equally reacting to light. EOMI. No scleral icterus.

No conjunctival pallor. Normocephalic, atraumatic. No pharyngeal erythema. No 

thyromegaly. 


CARDIOVASCULAR: S1 and S2 present. No murmurs, rubs, or gallops. 


PULMONARY: Chest is clear to auscultation, no wheezing or crackles. 


ABDOMEN: Tenderness with the mild distention the right lower quadrant 

normoactive bowel sounds. No palpable organomegaly. 


MUSCULOSKELETAL: No joint swelling or deformity.


EXTREMITIES: No cyanosis, clubbing, or pedal edema. 


NEUROLOGICAL: Gross neurological examination did not reveal any focal deficits. 


SKIN: No rashes. 

















Results


CBC & Chem 7: 


                                 05/23/20 08:20





                                 05/23/20 08:20


Labs: 


                  Abnormal Lab Results - Last 24 Hours (Table)











  05/23/20 05/23/20 Range/Units





  08:20 09:23 


 


Glucose  119 H   (74-99)  mg/dL


 


Ur Specific Gravity   1.043 H  (1.001-1.035)  














Assessment and Plan


Plan: 


-Diverticular abscess: Was a valid by general surgery the recommending IV 

antibiotics patient is presently in cefepime and metronidazole which will be 

continued.  Patient will be continued on IV fluids


-History of nicotine abuse he quit smoking





Patient is on appropriate antibiotics we'll continue to follow on as-needed 

basis

## 2020-05-24 LAB
ANION GAP SERPL CALC-SCNC: 5 MMOL/L
BASOPHILS # BLD AUTO: 0 K/UL (ref 0–0.2)
BASOPHILS NFR BLD AUTO: 0 %
BUN SERPL-SCNC: 11 MG/DL (ref 9–20)
CALCIUM SPEC-MCNC: 8.5 MG/DL (ref 8.4–10.2)
CHLORIDE SERPL-SCNC: 105 MMOL/L (ref 98–107)
CO2 SERPL-SCNC: 27 MMOL/L (ref 22–30)
EOSINOPHIL # BLD AUTO: 0.2 K/UL (ref 0–0.7)
EOSINOPHIL NFR BLD AUTO: 2 %
ERYTHROCYTE [DISTWIDTH] IN BLOOD BY AUTOMATED COUNT: 4.92 M/UL (ref 4.3–5.9)
ERYTHROCYTE [DISTWIDTH] IN BLOOD: 12.8 % (ref 11.5–15.5)
GLUCOSE SERPL-MCNC: 102 MG/DL (ref 74–99)
HCT VFR BLD AUTO: 43.8 % (ref 39–53)
HGB BLD-MCNC: 14.2 GM/DL (ref 13–17.5)
LYMPHOCYTES # SPEC AUTO: 1.8 K/UL (ref 1–4.8)
LYMPHOCYTES NFR SPEC AUTO: 20 %
MCH RBC QN AUTO: 28.9 PG (ref 25–35)
MCHC RBC AUTO-ENTMCNC: 32.5 G/DL (ref 31–37)
MCV RBC AUTO: 89 FL (ref 80–100)
MONOCYTES # BLD AUTO: 0.5 K/UL (ref 0–1)
MONOCYTES NFR BLD AUTO: 6 %
NEUTROPHILS # BLD AUTO: 6.5 K/UL (ref 1.3–7.7)
NEUTROPHILS NFR BLD AUTO: 71 %
PLATELET # BLD AUTO: 239 K/UL (ref 150–450)
POTASSIUM SERPL-SCNC: 4 MMOL/L (ref 3.5–5.1)
SODIUM SERPL-SCNC: 137 MMOL/L (ref 137–145)
WBC # BLD AUTO: 9.2 K/UL (ref 3.8–10.6)

## 2020-05-24 RX ADMIN — HYDROCODONE BITARTRATE AND ACETAMINOPHEN PRN EACH: 5; 325 TABLET ORAL at 04:33

## 2020-05-24 RX ADMIN — HYDROCODONE BITARTRATE AND ACETAMINOPHEN PRN EACH: 5; 325 TABLET ORAL at 17:31

## 2020-05-24 RX ADMIN — CEFAZOLIN SCH MLS/HR: 330 INJECTION, POWDER, FOR SOLUTION INTRAMUSCULAR; INTRAVENOUS at 12:35

## 2020-05-24 RX ADMIN — CEFEPIME HYDROCHLORIDE SCH MLS/HR: 2 INJECTION, POWDER, FOR SOLUTION INTRAVENOUS at 07:24

## 2020-05-24 RX ADMIN — METRONIDAZOLE SCH MLS/HR: 500 INJECTION, SOLUTION INTRAVENOUS at 12:29

## 2020-05-24 RX ADMIN — HYDROCODONE BITARTRATE AND ACETAMINOPHEN PRN EACH: 5; 325 TABLET ORAL at 22:53

## 2020-05-24 RX ADMIN — METRONIDAZOLE SCH MLS/HR: 500 INJECTION, SOLUTION INTRAVENOUS at 20:49

## 2020-05-24 RX ADMIN — METRONIDAZOLE SCH MLS/HR: 500 INJECTION, SOLUTION INTRAVENOUS at 04:27

## 2020-05-24 RX ADMIN — CEFAZOLIN SCH MLS/HR: 330 INJECTION, POWDER, FOR SOLUTION INTRAMUSCULAR; INTRAVENOUS at 04:27

## 2020-05-24 RX ADMIN — CEFEPIME HYDROCHLORIDE SCH MLS/HR: 2 INJECTION, POWDER, FOR SOLUTION INTRAVENOUS at 21:55

## 2020-05-24 NOTE — P.PN
Subjective





50-year-old male came in with complaints of severe right lower quadrant 

abdominal pain which is crampy in sensation progressively has been going on for 

a few days denied any constipation denied any nausea vomiting.  Patient is found

to have a diverticular abscess.  Patient was evaluated and admitted to general 

surgery then not recommending any surgical intervention patient was started on 

Rocephin and metronidazole which was later switched to cefepime by infectious 

disease.  Patient denied any fever chills.  Patient denied any dysuria doesn't 

have any major medical problems.





05/24/2020


His abdominal pain is better but still has some tenderness in the right lower 

quadrant patient looks better overall clinically.





Constitutional: Denied any fatigue denied any fever.


Cardio vascular: denied any chest pain, palpitations


Gastrointestinal denied any nausea vomiting


Pulmonary: Denied any shortness of breath cough


Neurologic denied any new focal deficits





All inpatient medications were reviewed and appropriate changes in these 

medications as dictated in the interval history and assessment and plan.





Objective





- Vital Signs


Vital signs: 


                                   Vital Signs











Temp  98.3 F   05/24/20 07:10


 


Pulse  67   05/24/20 07:10


 


Resp  16   05/24/20 07:10


 


BP  134/85   05/24/20 07:10


 


Pulse Ox  94 L  05/24/20 07:10








                                 Intake & Output











 05/23/20 05/24/20 05/24/20





 18:59 06:59 18:59


 


Weight 95.254 kg  


 


Other:   


 


  # Voids 1 3 














- Exam











PHYSICAL EXAMINATION: 





GENERAL: The patient is alert and oriented x3, not in any acute distress. Well 

developed, well nourished. 


HEENT: Pupils are round and equally reacting to light. EOMI. No scleral icterus.

No conjunctival pallor. Normocephalic, atraumatic. No pharyngeal erythema. No 

thyromegaly. 


CARDIOVASCULAR: S1 and S2 present. No murmurs, rubs, or gallops. 


PULMONARY: Chest is clear to auscultation, no wheezing or crackles. 


ABDOMEN:Theo distended with mild tenderness in the right lower quadrant n

ormoactive bowel sounds. No palpable organomegaly. 


MUSCULOSKELETAL: No joint swelling or deformity.


EXTREMITIES: No cyanosis, clubbing, or pedal edema. 


NEUROLOGICAL: Gross neurological examination did not reveal any focal deficits. 


SKIN: No rashes. 














- Labs


CBC & Chem 7: 


                                 05/24/20 07:23





                                 05/24/20 07:23


Labs: 


                  Abnormal Lab Results - Last 24 Hours (Table)











  05/24/20 Range/Units





  07:23 


 


Glucose  102 H  (74-99)  mg/dL














Assessment and Plan


Plan: 


-Diverticular abscess: Was a valid by general surgery the recommending IV 

antibiotics patient is presently in cefepime and metronidazole which will be 

continued.  Patient will be continued on IV fluids


-History of nicotine abuse he quit smoking





Patient is on appropriate antibiotics we'll continue to follow on as-needed 

basis

## 2020-05-24 NOTE — PN
PROGRESS NOTE



DATE OF SERVICE:

05/24/2020



REASON FOR FOLLOWUP:

Abdominal abscess.



INTERVAL HISTORY:

The patient is currently afebrile. He is breathing comfortably.  He is still having

abdominal pain, the same as yesterday, not significant change.  Some nausea but no

vomiting.  No chest pain, shortness of breath or cough.



PHYSICAL EXAMINATION:

Blood pressure 159/77 with a pulse of 80, temperature 98.4.  He is 94% on room.

General air description is a middle-aged male lying in bed in no distress. Respiratory

system: Unlabored breathing, clear to auscultation anteriorly.  Heart S1, S2.  Regular

rate and rhythm.  Abdomen soft, tender on the right side.  No guarding or rigidity.

Extremities, no edema of the feet.



LABS:

Hemoglobin is 14.1, white count of 9.2 with BUN of 11, creatinine 0.85.



DIAGNOSTIC IMPRESSION AND PLAN:

Patient admitted to the hospital with abdominal abscess with concern for possible

diverticular abscess, on cefepime and Flagyl because of his allergy.  Surgeon wants to

continue with IV antibiotic and monitor clinical course closely.  Continue supportive

care.





MMODL / IJN: [id number] / Job#: [id number]

## 2020-05-24 NOTE — P.PN
Progress Note - Text


Progress Note Date: 05/24/20





The patient still has complaints of right quadrant pain.  He states his pain is 

a 3-5 out of 10.  He does feel better than yesterday.





On exam his vital signs are stable.  Abdomen soft.  There is some mild 

epigastric and right upper quadrant pain.





History of colitis/possible diverticular abscess of hepatic flexure.  Patient 

will continue receive IV antibiotic.  Remain on clear liquids.

## 2020-05-24 NOTE — P.CONS
History of Present Illness





- Reason for Consult


Consult date: 05/23/20


abd abscess


Requesting physician: Robin Aguilar





- Chief Complaint


abd pain x 3 days





- History of Present Illness


Patient is a 50-year-old  male presenting to the ER at [facility] with chief complaints of right lower quadrant abdominal pain 

patient has been going on for 3 days before presentation the hospital patient 

describes the pain to be more of a cramping and worse with any movement 

intensity can be as high as a 10 out of 10 in severity.  Have some nausea but no

vomiting and denies having any diarrhea with worsening of the symptom patient 

did present to the hospital on arrival to the ER the patient was afebrile he did

have a normal white count UA was negative patient did have a CT of abdominal 

pelvis there was reported to be colitis or diverticular abscess patient received

a dose of Rocephin and Flagyl in the ER subsequently patient has been admitted 

to hospital infectious disease was consulted for further management of antibiot

ic therapy.








Review of Systems


Positive point has been mentioned in HPI rest of the systems are negative











Past Medical History


Past Medical History: No Reported History


History of Any Multi-Drug Resistant Organisms: None Reported


Past Surgical History: Cholecystectomy


Additional Past Surgical History / Comment(s): vasectomy


Past Anesthesia/Blood Transfusion Reactions: No Reported Reaction


Past Psychological History: No Psychological Hx Reported


Smoking Status: Former smoker


Past Alcohol Use History: Occasional


Past Drug Use History: None Reported





- Past Family History


  ** Father


Additional Family Medical History / Comment(s): heart issues with blood vessels.





  ** Mother


Family Medical History: No Reported History


Additional Family Medical History / Comment(s): colitis





Medications and Allergies


                                Home Medications











 Medication  Instructions  Recorded  Confirmed  Type


 


No Known Home Medications  05/23/20 05/23/20 History








                                    Allergies











Allergy/AdvReac Type Severity Reaction Status Date / Time


 


Penicillins Allergy  Swelling Verified 05/23/20 10:04














Physical Exam


Vitals: 


                                   Vital Signs











  Temp Pulse Pulse Resp BP BP Pulse Ox


 


 05/23/20 11:37  98.0 F   69  18   150/92  95


 


 05/23/20 10:32   77   20  143/99   97


 


 05/23/20 10:31   77   20  143/99  


 


 05/23/20 09:00   74   20  146/100   97


 


 05/23/20 08:57   74   20  146/100   97


 


 05/23/20 07:57     20   


 


 05/23/20 07:55  98.3 F  89   18  149/107   98








                                Intake and Output











 05/22/20 05/23/20 05/23/20





 22:59 06:59 14:59


 


Other:   


 


  Weight   95.254 kg











GENERAL DESCRIPTION: Middle-aged male lying in bed, no distress. No tachypnea or

accessory muscle of respiration use.


HEENT: Shows Pallor , no scleral icterus. Oral mucous membrane is dry.


NECK: Trachea central, no thyromegaly.


LUNGS: Unlabored breathing. Clear to auscultation anteriorly. No wheeze or 

crackle.


HEART: S1, S2, regular rate and rhythm.


ABDOMEN: Soft, right lower quadrant tenderness , no guarding or rigidity


EXTREMITIES: No edema of feet.


SKIN: No rash, no masses palpable.


NEUROLOGICAL: The patient is awake, alert, oriented x3, mood and affect normal.











Results


CBC & Chem 7: 


                                 05/23/20 08:20





                                 05/23/20 08:20


Labs: 


                  Abnormal Lab Results - Last 24 Hours (Table)











  05/23/20 05/23/20 Range/Units





  08:20 09:23 


 


Glucose  119 H   (74-99)  mg/dL


 


Ur Specific Gravity   1.043 H  (1.001-1.035)  














Assessment and Plan


Assessment: 


1-patient presenting to the hospital with abdominal pain in this patient who is 

status post CT abdominal pelvis suggestive of a diverticular abscess in this 

patient who has not been on antibiotic in the recent past could be sensitive 

pathogen such as E. coli and bacteroids


2-patient with a penicillin allergy that would limit the number of antibiotics 

safe to use








(1) Intestinal diverticular abscess


Current Visit: Yes   Status: Acute   Code(s): K63.0 - ABSCESS OF INTESTINE   

SNOMED Code(s): 5127139438941


   


Plan: 


1-we will start the patient on cefepime 2 g every 12hr and Flagyl 500 mg IV 

every 8 hour


2-gentle IV fluid we will follow on clinical condition and cultures to further 

adjust medication if needed


Thank you for this consultation we will follow the patient along with you











Time with Patient: Greater than 30

## 2020-05-25 RX ADMIN — METRONIDAZOLE SCH MLS/HR: 500 INJECTION, SOLUTION INTRAVENOUS at 04:22

## 2020-05-25 RX ADMIN — METRONIDAZOLE SCH MLS/HR: 500 INJECTION, SOLUTION INTRAVENOUS at 21:00

## 2020-05-25 RX ADMIN — CEFEPIME HYDROCHLORIDE SCH MLS/HR: 2 INJECTION, POWDER, FOR SOLUTION INTRAVENOUS at 08:25

## 2020-05-25 RX ADMIN — CEFEPIME HYDROCHLORIDE SCH MLS/HR: 2 INJECTION, POWDER, FOR SOLUTION INTRAVENOUS at 21:00

## 2020-05-25 RX ADMIN — HYDROCODONE BITARTRATE AND ACETAMINOPHEN PRN EACH: 5; 325 TABLET ORAL at 20:59

## 2020-05-25 RX ADMIN — CEFAZOLIN SCH MLS/HR: 330 INJECTION, POWDER, FOR SOLUTION INTRAMUSCULAR; INTRAVENOUS at 04:23

## 2020-05-25 RX ADMIN — METRONIDAZOLE SCH MLS/HR: 500 INJECTION, SOLUTION INTRAVENOUS at 11:36

## 2020-05-25 NOTE — P.PN
Progress Note - Text


Progress Note Date: 05/25/20





The patient feels better today.  He still has pain in the right upper quadrant 

however the pain has improved.  He is having bowel movements and passing gas.





On exam his vital signs are stable.  His abdomen soft.





Patient will have his diet advanced fully liquid diet.  We dysphagia discharged 

home the next 24-48 hours.  We will plan for outpatient colonoscopy at that 

time.

## 2020-05-25 NOTE — P.PN
Subjective





50-year-old male came in with complaints of severe right lower quadrant 

abdominal pain which is crampy in sensation progressively has been going on for 

a few days denied any constipation denied any nausea vomiting.  Patient is found

to have a diverticular abscess.  Patient was evaluated and admitted to general 

surgery then not recommending any surgical intervention patient was started on 

Rocephin and metronidazole which was later switched to cefepime by infectious 

disease.  Patient denied any fever chills.  Patient denied any dysuria doesn't 

have any major medical problems.





05/24/2020


His abdominal pain is better but still has some tenderness in the right lower 

quadrant patient looks better overall clinically.





05/25/2020


Patient's abdominal pain continued to improve1 patient probably will be 

discharged tomorrow





Constitutional: Denied any fatigue denied any fever.


Cardio vascular: denied any chest pain, palpitations


Gastrointestinal denied any nausea vomiting


Pulmonary: Denied any shortness of breath cough


Neurologic denied any new focal deficits





All inpatient medications were reviewed and appropriate changes in these 

medications as dictated in the interval history and assessment and plan.





Objective





- Vital Signs


Vital signs: 


                                   Vital Signs











Temp  97.8 F   05/25/20 07:44


 


Pulse  68   05/25/20 07:44


 


Resp  16   05/25/20 07:44


 


BP  150/99   05/25/20 07:44


 


Pulse Ox  93 L  05/25/20 07:44








                                 Intake & Output











 05/24/20 05/25/20 05/25/20





 18:59 06:59 18:59


 


Intake Total   725


 


Balance   725


 


Intake:   


 


  Intake, IV Titration   725





  Amount   


 


    Cefepime 2 gm In Sodium   100





    Chloride 0.9% 100 ml @   





    200 mls/hr IVPB Q12HR JONO   





    Rx#:[id number]   


 


    Sodium Chloride 0.9% 1,   525





    000 ml @ 75 mls/hr IV .   





    [id number] JONO Rx#:[id number]   


 


    metroNIDAZOLE-NS    100





    mg In Saline 1 100ml.bag   





    @ 100 mls/hr IVPB Q8H JONO   





    Rx#:[id number]   


 


Other:   


 


  # Voids 3 2 














- Exam











PHYSICAL EXAMINATION: 





GENERAL: The patient is alert and oriented x3, not in any acute distress. Well 

developed, well nourished. 


HEENT: Pupils are round and equally reacting to light. EOMI. No scleral icterus.

No conjunctival pallor. Normocephalic, atraumatic. No pharyngeal erythema. No 

thyromegaly. 


CARDIOVASCULAR: S1 and S2 present. No murmurs, rubs, or gallops. 


PULMONARY: Chest is clear to auscultation, no wheezing or crackles. 


ABDOMEN:Theo distended with mild tenderness in the right lower quadrant 

normoactive bowel sounds. No palpable organomegaly. 


MUSCULOSKELETAL: No joint swelling or deformity.


EXTREMITIES: No cyanosis, clubbing, or pedal edema. 


NEUROLOGICAL: Gross neurological examination did not reveal any focal deficits. 


SKIN: No rashes. 














- Labs


CBC & Chem 7: 


                                 05/24/20 07:23





                                 05/24/20 07:23


Labs: 


                      Microbiology - Last 24 Hours (Table)











 05/23/20 10:41 Blood Culture - Preliminary





 Blood    No Growth after 48 hours














Assessment and Plan


Plan: 


-Diverticular abscess: Was a valid by general surgery the recommending IV 

antibiotics patient is presently in cefepime and metronidazole which will be 

continued.    IV fluids will be discontinued patient has remained independent 

probably will be discharged tomorrow


-History of nicotine abuse he quit smoking





Patient is on appropriate antibiotics we'll continue to follow on as-needed 

basis

## 2020-05-25 NOTE — PN
PROGRESS NOTE



DATE OF SERVICE:

05/25/2020



REASON FOR FOLLOWUP:

Abdominal abscess, possible perforated diverticulitis.



INTERVAL HISTORY:

The patient is currently afebrile.  The patient's abdominal pain is currently improved.

The patient started on clear liquid diet which he has been tolerating.  No chest pain,

shortness of breath or cough.



PHYSICAL EXAMINATION:

Blood pressure 154/90 with a pulse of 67, temperature is 98.2.  He is 95% on room air.

General description is a middle-aged male lying in bed in no distress.

Respiratory system: Unlabored breathing.  Clear to auscultation anteriorly.

HEART S1, S2.  Regular rate and rhythm.

ABDOMEN:  Soft, mildly tender.

EXTREMITIES:  No edema of the feet.



LABS:

Hemoglobin 14.1, white count 9.2 with a BUN of 11, creatinine 0.85.  Blood culture

negative.



DIAGNOSTIC IMPRESSION AND PLAN:

Patient with abdominal abscess, likely from a ruptured diverticulitis.  Overall

improved with cefepime and Flagyl.  We will try to arrange for outpatient IV antibiotic

on discharge as surgery is currently being delayed until his _____ infection is

resolved.  Continue supportive care.





MMODL / IJN: [id number] / Job#: [id number]

## 2020-05-26 RX ADMIN — CEFEPIME HYDROCHLORIDE SCH MLS/HR: 2 INJECTION, POWDER, FOR SOLUTION INTRAVENOUS at 21:19

## 2020-05-26 RX ADMIN — METRONIDAZOLE SCH MLS/HR: 500 INJECTION, SOLUTION INTRAVENOUS at 21:20

## 2020-05-26 RX ADMIN — METRONIDAZOLE SCH MLS/HR: 500 INJECTION, SOLUTION INTRAVENOUS at 12:21

## 2020-05-26 RX ADMIN — CEFEPIME HYDROCHLORIDE SCH MLS/HR: 2 INJECTION, POWDER, FOR SOLUTION INTRAVENOUS at 08:36

## 2020-05-26 RX ADMIN — METRONIDAZOLE SCH MLS/HR: 500 INJECTION, SOLUTION INTRAVENOUS at 05:44

## 2020-05-26 NOTE — P.PN
Subjective





50-year-old male came in with complaints of severe right lower quadrant 

abdominal pain which is crampy in sensation progressively has been going on for 

a few days denied any constipation denied any nausea vomiting.  Patient is found

to have a diverticular abscess.  Patient was evaluated and admitted to general 

surgery then not recommending any surgical intervention patient was started on 

Rocephin and metronidazole which was later switched to cefepime by infectious 

disease.  Patient denied any fever chills.  Patient denied any dysuria doesn't 

have any major medical problems.





05/24/2020


His abdominal pain is better but still has some tenderness in the right lower 

quadrant patient looks better overall clinically.





05/25/2020


Patient's abdominal pain continued to improve1 patient probably will be 

discharged tomorrow





05/26/2020


Patient remains on the same antibiotics abdominal pain fairly remain stable and 

almost similar yesterday





Constitutional: Denied any fatigue denied any fever.


Cardio vascular: denied any chest pain, palpitations


Gastrointestinal denied any nausea vomiting


Pulmonary: Denied any shortness of breath cough


Neurologic denied any new focal deficits





All inpatient medications were reviewed and appropriate changes in these 

medications as dictated in the interval history and assessment and plan.





Objective





- Vital Signs


Vital signs: 


                                   Vital Signs











Temp  98.0 F   05/26/20 06:58


 


Pulse  76   05/26/20 06:58


 


Resp  16   05/26/20 06:58


 


BP  158/83   05/26/20 06:58


 


Pulse Ox  96   05/26/20 06:58








                                 Intake & Output











 05/25/20 05/26/20 05/26/20





 18:59 06:59 18:59


 


Intake Total 1265  


 


Balance 1265  


 


Intake:   


 


  Intake, IV Titration 725  





  Amount   


 


    Cefepime 2 gm In Sodium 100  





    Chloride 0.9% 100 ml @   





    200 mls/hr IVPB Q12HR JONO   





    Rx#:[id number]   


 


    Sodium Chloride 0.9% 1, 525  





    000 ml @ 75 mls/hr IV .   





    [id number] JONO Rx#:[id number]   


 


    metroNIDAZOLE-NS  100  





    mg In Saline 1 100ml.bag   





    @ 100 mls/hr IVPB Q8H JONO   





    Rx#:[id number]   


 


  Oral 540  


 


Other:   


 


  # Voids 3 1 1














- Exam











PHYSICAL EXAMINATION: 





GENERAL: The patient is alert and oriented x3, not in any acute distress. Well 

developed, well nourished. 


HEENT: Pupils are round and equally reacting to light. EOMI. No scleral icterus.

No conjunctival pallor. Normocephalic, atraumatic. No pharyngeal erythema. No 

thyromegaly. 


CARDIOVASCULAR: S1 and S2 present. No murmurs, rubs, or gallops. 


PULMONARY: Chest is clear to auscultation, no wheezing or crackles. 


ABDOMEN:Theo distended with mild tenderness in the right lower quadrant 

normoactive bowel sounds. No palpable organomegaly. 


MUSCULOSKELETAL: No joint swelling or deformity.


EXTREMITIES: No cyanosis, clubbing, or pedal edema. 


NEUROLOGICAL: Gross neurological examination did not reveal any focal deficits. 


SKIN: No rashes. 














- Labs


CBC & Chem 7: 


                                 05/24/20 07:23





                                 05/24/20 07:23


Labs: 


                      Microbiology - Last 24 Hours (Table)











 05/23/20 10:41 Blood Culture - Preliminary





 Blood    No Growth after 72 hours














Assessment and Plan


Plan: 


-Diverticular abscess: Was a valid by general surgery the recommending IV 

antibiotics patient is presently in cefepime and metronidazole which will be 

continued.    IV fluids will be discontinued patient has remained independent 

probably will be discharged tomorrow


-History of nicotine abuse he quit smoking





Patient is on appropriate antibiotics we'll continue to follow on as-needed 

basis

## 2020-05-26 NOTE — PN
PROGRESS NOTE



DATE OF SERVICE:

05/26/2020



REASON FOR FOLLOWUP:

Abdominal abscess from perforated diverticulitis.



INTERVAL HISTORY:

The patient is currently afebrile.  The patient is breathing comfortably.  Patient

denies having any chest pain.  No shortness of breath or cough.  Abdominal pain is

currently controlled.  No nausea, no vomiting.  No diarrhea.



PHYSICAL EXAMINATION:

Blood pressure 152/83 with a pulse of 73, temperature 98, he is 96% on room air.

General description is a middle-aged male, up in the bed in no distress.

RESPIRATORY SYSTEM: Unlabored breathing, clear to auscultation anteriorly.

HEART:  S1, S2.  Regular rate and rhythm.

ABDOMEN:  Soft, minimal tenderness.



LABS:

Hemoglobin is 14.8, white count of 9.2, BUN of 11, creatinine 0.85.  Blood cultures

have been negative.



DIAGNOSTIC IMPRESSION AND PLAN:

Patient with abdominal abscess from a perforated diverticulitis, is recommending

medical treatment before any surgical intervention.  Will get a midline and recommends

Rocephin 2 grams daily along with oral Flagyl for 2 weeks and close outpatient

followup.





MMODL / IJN: [id number] / Job#: [id number]

## 2020-05-26 NOTE — P.PN
Subjective


Progress Note Date: 05/26/20





CHIEF COMPLAINT: Abdominal pain





HISTORY OF PRESENT ILLNESS: Patient examined at the bedside with Dr. Aguilar.  

Patient continues to report some abdominal pain today.  He reports a bowel 

movement yesterday.  He is tolerating diet without nausea or vomiting.





PHYSICAL EXAM: 


VITAL SIGNS: Reviewed.


GENERAL: Well-developed in no acute distress. 


HEENT:  No sclera icterus. Extraocular movements grossly intact.  Moist buccal 

mucosa. Head is atraumatic, normocephalic. 


ABDOMEN:  Soft.  Nondistended.  Mild tenderness with palpation


NEUROLOGIC: Alert and oriented. Cranial nerves II through XII grossly intact.





ASSESSMENT: 


1.  Right-sided abdominal pain


2.  Possible diverticular abscess of hepatic flexure





PLAN: 


-Continue full liquid diet


-Continue antibiotics


-Hold discharge today secondary to continued abdominal pain.  Possible discharge

tomorrow


-Patient will require outpatient colonoscopy in the future





Nurse practitioner note has been reviewed by physician. Signing provider agrees 

with the documented findings, assessment, and plan of care. 








Objective





- Vital Signs


Vital signs: 


                                   Vital Signs











Temp  98.0 F   05/26/20 06:58


 


Pulse  76   05/26/20 06:58


 


Resp  16   05/26/20 06:58


 


BP  158/83   05/26/20 06:58


 


Pulse Ox  96   05/26/20 06:58








                                 Intake & Output











 05/25/20 05/26/20 05/26/20





 18:59 06:59 18:59


 


Intake Total 1265  


 


Balance 1265  


 


Intake:   


 


  Intake, IV Titration 725  





  Amount   


 


    Cefepime 2 gm In Sodium 100  





    Chloride 0.9% 100 ml @   





    200 mls/hr IVPB Q12HR JONO   





    Rx#:[id number]   


 


    Sodium Chloride 0.9% 1, 525  





    000 ml @ 75 mls/hr IV .   





    [id number] JONO Rx#:[id number]   


 


    metroNIDAZOLE-NS  100  





    mg In Saline 1 100ml.bag   





    @ 100 mls/hr IVPB Q8H JONO   





    Rx#:[id number]   


 


  Oral 540  


 


Other:   


 


  # Voids 3 1 1














- Labs


CBC & Chem 7: 


                                 05/24/20 07:23





                                 05/24/20 07:23


Labs: 


                      Microbiology - Last 24 Hours (Table)











 05/23/20 10:41 Blood Culture - Preliminary





 Blood    No Growth after 72 hours

## 2020-05-27 VITALS — SYSTOLIC BLOOD PRESSURE: 158 MMHG | TEMPERATURE: 98.2 F | HEART RATE: 74 BPM | DIASTOLIC BLOOD PRESSURE: 85 MMHG

## 2020-05-27 VITALS — RESPIRATION RATE: 17 BRPM

## 2020-05-27 RX ADMIN — METRONIDAZOLE SCH MLS/HR: 500 INJECTION, SOLUTION INTRAVENOUS at 05:42

## 2020-05-27 RX ADMIN — CEFEPIME HYDROCHLORIDE SCH MLS/HR: 2 INJECTION, POWDER, FOR SOLUTION INTRAVENOUS at 08:51

## 2020-05-27 NOTE — PN
PROGRESS NOTE



DATE OF SERVICE:

05/27/2020



REASON FOR FOLLOWUP:

Abdominal abscess from ruptured diverticulitis.



INTERVAL HISTORY:

The patient is currently afebrile.  The patient is feeling much better, mentioning his

almost 80% better.  No chest pain.  No shortness of breath or cough.  Did have some

dark stool but not any diarrhea.



PHYSICAL EXAMINATION:

Blood pressure 123/84 with a pulse of 72, temperature is 97.2, he is 94% on room air.

General description is a middle-aged male, lying in bed in no distress.

RESPIRATORY SYSTEM: Unlabored breathing, clear to auscultation anteriorly.

HEART:  S1, S2.  Regular rate and rhythm.

ABDOMEN:  Soft, no tenderness.



LABS:

Hemoglobin is 14.8, white count of 9.2, creatinine 0.85.  Blood culture has been

negative.



DIAGNOSTIC IMPRESSION AND PLAN:

Patient with acute abdominal pain, likely from a ruptured diverticulitis.  Patient has

shown clinical improvement on IV antibiotic therapy.  Discussed with the surgeon on the

patient to be on IV and oral antibiotic on discharge with IV prescription for Cipro 500

mg twice a day, Flagyl 500 mg 3 times a day for 10 days has been recommended with close

outpatient followup.





MMODL / IJN: [id number] / Job#: [id number]

## 2020-05-27 NOTE — P.PN
<Elizabeth Cruz - Last Filed: 05/27/20 10:04>





Subjective


Progress Note Date: 05/27/20





CHIEF COMPLAINT: Abdominal pain





HISTORY OF PRESENT ILLNESS: Patient examined at the bedside. Patient reports 

minimal right sided abdominal pain. Tolerating diet. No nausea or vomiting. 

Passing flatus. 





PHYSICAL EXAM: 


VITAL SIGNS: Reviewed.


GENERAL: Well-developed in no acute distress. 


HEENT:  No sclera icterus. Extraocular movements grossly intact.  Moist buccal 

mucosa. Head is atraumatic, normocephalic. 


ABDOMEN:  Soft.  Nondistended. Nontender.


NEUROLOGIC: Alert and oriented. Cranial nerves II through XII grossly intact.





ASSESSMENT: 


1.  Right-sided abdominal pain


2.  Possible diverticular abscess of hepatic flexure





PLAN: 


-Continue full liquid diet


-Patient will require outpatient colonoscopy in the future





-Dr. Carbajal recommending midline IV placement with Rocephin for 2 weeks and oral 

Flagyl.  Patient states Dr. Aguilar told him he would not require IV 

antibiotics at discharge and would be discharged home on oral antibiotics only 

so patient refused midline IV catheter yesterday.





-Will discuss antibiotics with Dr. Aguilar today and plan for discharge home 

this afternoon.





Nurse practitioner note has been reviewed by physician. Signing provider agrees 

with the documented findings, assessment, and plan of care. 








Objective





- Vital Signs


Vital signs: 


                                   Vital Signs











Temp  97.2 F L  05/27/20 07:00


 


Pulse  72   05/27/20 07:00


 


Resp  17   05/27/20 07:00


 


BP  123/84   05/27/20 07:00


 


Pulse Ox  94 L  05/27/20 07:00








                                 Intake & Output











 05/26/20 05/27/20 05/27/20





 18:59 06:59 18:59


 


Intake Total 890  


 


Balance 890  


 


Intake:   


 


  Intake, IV Titration 350  





  Amount   


 


    Cefepime 2 gm In Sodium 100  





    Chloride 0.9% 100 ml @   





    200 mls/hr IVPB Q12HR JONO   





    Rx#:[id number]   


 


    Sodium Chloride 0.9% 1, 150  





    000 ml @ 75 mls/hr IV .   





    [id number] JONO Rx#:[id number]   


 


    metroNIDAZOLE-NS  100  





    mg In Saline 1 100ml.bag   





    @ 100 mls/hr IVPB Q8H JONO   





    Rx#:[id number]   


 


  Oral 540  


 


Other:   


 


  Voiding Method  Toilet 


 


  # Voids 2 1 














- Labs


CBC & Chem 7: 


                                 05/24/20 07:23





                                 05/24/20 07:23


Labs: 


                      Microbiology - Last 24 Hours (Table)











 05/23/20 10:41 Blood Culture - Preliminary





 Blood    No Growth after 72 hours














<Robin Aguilar - Last Filed: 05/27/20 16:34>





Objective





- Vital Signs


Vital signs: 


                                   Vital Signs











Temp  98.2 F   05/27/20 15:00


 


Pulse  74   05/27/20 15:00


 


Resp  17   05/27/20 15:00


 


BP  158/85   05/27/20 15:00


 


Pulse Ox  95   05/27/20 15:00








                                 Intake & Output











 05/26/20 05/27/20 05/27/20





 18:59 06:59 18:59


 


Intake Total 890  200


 


Balance 890  200


 


Intake:   


 


  Intake, IV Titration 350  200





  Amount   


 


    Cefepime 2 gm In Sodium 100  100





    Chloride 0.9% 100 ml @   





    200 mls/hr IVPB Q12HR JONO   





    Rx#:[id number]   


 


    Sodium Chloride 0.9% 1, 150  





    000 ml @ 75 mls/hr IV .   





    [id number] JONO Rx#:[id number]   


 


    metroNIDAZOLE-NS  100  100





    mg In Saline 1 100ml.bag   





    @ 100 mls/hr IVPB Q8H JONO   





    Rx#:[id number]   


 


  Oral 540  


 


Other:   


 


  Voiding Method  Toilet 


 


  # Voids 2 1 1


 


  # Bowel Movements   1














- Labs


CBC & Chem 7: 


                                 05/24/20 07:23





                                 05/24/20 07:23


Labs: 


                      Microbiology - Last 24 Hours (Table)











 05/23/20 10:41 Blood Culture - Preliminary





 Blood    No Growth after 96 hours

## 2020-05-27 NOTE — P.PN
Subjective





50-year-old male came in with complaints of severe right lower quadrant 

abdominal pain which is crampy in sensation progressively has been going on for 

a few days denied any constipation denied any nausea vomiting.  Patient is found

to have a diverticular abscess.  Patient was evaluated and admitted to general 

surgery then not recommending any surgical intervention patient was started on 

Rocephin and metronidazole which was later switched to cefepime by infectious 

disease.  Patient denied any fever chills.  Patient denied any dysuria doesn't 

have any major medical problems.





05/24/2020


His abdominal pain is better but still has some tenderness in the right lower 

quadrant patient looks better overall clinically.





05/25/2020


Patient's abdominal pain continued to improve1 patient probably will be 

discharged tomorrow





05/26/2020


Patient remains on the same antibiotics abdominal pain fairly remain stable and 

almost similar yesterday





05/27/2020


It appears that the surgery and infectious disease are debating about IV versus 

oral antibiotics after that patient most probably will be discharged today 

patient feels better can be discharged from medical perspective





Constitutional: Denied any fatigue denied any fever.


Cardio vascular: denied any chest pain, palpitations


Gastrointestinal denied any nausea vomiting


Pulmonary: Denied any shortness of breath cough


Neurologic denied any new focal deficits





All inpatient medications were reviewed and appropriate changes in these 

medications as dictated in the interval history and assessment and plan.





Objective





- Vital Signs


Vital signs: 


                                   Vital Signs











Temp  97.2 F L  05/27/20 07:00


 


Pulse  72   05/27/20 07:00


 


Resp  17   05/27/20 07:00


 


BP  123/84   05/27/20 07:00


 


Pulse Ox  94 L  05/27/20 07:00








                                 Intake & Output











 05/26/20 05/27/20 05/27/20





 18:59 06:59 18:59


 


Intake Total 890  


 


Balance 890  


 


Intake:   


 


  Intake, IV Titration 350  





  Amount   


 


    Cefepime 2 gm In Sodium 100  





    Chloride 0.9% 100 ml @   





    200 mls/hr IVPB Q12HR JONO   





    Rx#:[id number]   


 


    Sodium Chloride 0.9% 1, 150  





    000 ml @ 75 mls/hr IV .   





    [id number] JONO Rx#:[id number]   


 


    metroNIDAZOLE-NS  100  





    mg In Saline 1 100ml.bag   





    @ 100 mls/hr IVPB Q8H JONO   





    Rx#:[id number]   


 


  Oral 540  


 


Other:   


 


  Voiding Method  Toilet 


 


  # Voids 2 1 














- Exam











PHYSICAL EXAMINATION: 





GENERAL: The patient is alert and oriented x3, not in any acute distress. Well 

developed, well nourished. 


HEENT: Pupils are round and equally reacting to light. EOMI. No scleral icterus.

No conjunctival pallor. Normocephalic, atraumatic. No pharyngeal erythema. No t

hyromegaly. 


CARDIOVASCULAR: S1 and S2 present. No murmurs, rubs, or gallops. 


PULMONARY: Chest is clear to auscultation, no wheezing or crackles. 


ABDOMEN: No tenderness soft abdomen normoactive bowel sounds. No palpable 

organomegaly. 


MUSCULOSKELETAL: No joint swelling or deformity.


EXTREMITIES: No cyanosis, clubbing, or pedal edema. 


NEUROLOGICAL: Gross neurological examination did not reveal any focal deficits. 


SKIN: No rashes. 














- Labs


CBC & Chem 7: 


                                 05/24/20 07:23





                                 05/24/20 07:23


Labs: 


                      Microbiology - Last 24 Hours (Table)











 05/23/20 10:41 Blood Culture - Preliminary





 Blood    No Growth after 72 hours














Assessment and Plan


Plan: 


-Diverticular abscess: Was a valid by general surgery the recommending IV 

antibiotics patient is presently in cefepime and metronidazole which will be 

continued.  Patient probably will be discharged today


-History of nicotine abuse he quit smoking





Patient is on appropriate antibiotics we'll continue to follow on as-needed 

basis

## 2020-06-02 ENCOUNTER — HOSPITAL ENCOUNTER (EMERGENCY)
Dept: HOSPITAL 47 - EC | Age: 50
Discharge: HOME | End: 2020-06-02
Payer: COMMERCIAL

## 2020-06-02 VITALS — RESPIRATION RATE: 18 BRPM | TEMPERATURE: 98.3 F

## 2020-06-02 VITALS — SYSTOLIC BLOOD PRESSURE: 144 MMHG | HEART RATE: 77 BPM | DIASTOLIC BLOOD PRESSURE: 97 MMHG

## 2020-06-02 DIAGNOSIS — Z87.891: ICD-10-CM

## 2020-06-02 DIAGNOSIS — Z88.0: ICD-10-CM

## 2020-06-02 DIAGNOSIS — Z90.49: ICD-10-CM

## 2020-06-02 DIAGNOSIS — R31.9: Primary | ICD-10-CM

## 2020-06-02 LAB
ALBUMIN SERPL-MCNC: 4.6 G/DL (ref 3.5–5)
ALP SERPL-CCNC: 56 U/L (ref 38–126)
ALT SERPL-CCNC: 36 U/L (ref 4–49)
AMYLASE SERPL-CCNC: 56 U/L (ref 30–110)
ANION GAP SERPL CALC-SCNC: 12 MMOL/L
APTT BLD: 25.5 SEC (ref 22–30)
AST SERPL-CCNC: 28 U/L (ref 17–59)
BASOPHILS # BLD AUTO: 0.1 K/UL (ref 0–0.2)
BASOPHILS NFR BLD AUTO: 1 %
BUN SERPL-SCNC: 15 MG/DL (ref 9–20)
CALCIUM SPEC-MCNC: 9.9 MG/DL (ref 8.4–10.2)
CHLORIDE SERPL-SCNC: 104 MMOL/L (ref 98–107)
CO2 SERPL-SCNC: 23 MMOL/L (ref 22–30)
EOSINOPHIL # BLD AUTO: 0.1 K/UL (ref 0–0.7)
EOSINOPHIL NFR BLD AUTO: 2 %
ERYTHROCYTE [DISTWIDTH] IN BLOOD BY AUTOMATED COUNT: 5.27 M/UL (ref 4.3–5.9)
ERYTHROCYTE [DISTWIDTH] IN BLOOD: 12.6 % (ref 11.5–15.5)
GLUCOSE SERPL-MCNC: 107 MG/DL (ref 74–99)
HCT VFR BLD AUTO: 46.6 % (ref 39–53)
HGB BLD-MCNC: 15.3 GM/DL (ref 13–17.5)
HYALINE CASTS UR QL AUTO: 11 /LPF (ref 0–2)
INR PPP: 1.1 (ref ?–1.2)
KETONES UR QL STRIP.AUTO: (no result)
LYMPHOCYTES # SPEC AUTO: 2.3 K/UL (ref 1–4.8)
LYMPHOCYTES NFR SPEC AUTO: 27 %
MCH RBC QN AUTO: 29 PG (ref 25–35)
MCHC RBC AUTO-ENTMCNC: 32.8 G/DL (ref 31–37)
MCV RBC AUTO: 88.3 FL (ref 80–100)
MONOCYTES # BLD AUTO: 0.5 K/UL (ref 0–1)
MONOCYTES NFR BLD AUTO: 6 %
NEUTROPHILS # BLD AUTO: 5.4 K/UL (ref 1.3–7.7)
NEUTROPHILS NFR BLD AUTO: 63 %
PH UR: 5 [PH] (ref 5–8)
PLATELET # BLD AUTO: 383 K/UL (ref 150–450)
POTASSIUM SERPL-SCNC: 3.8 MMOL/L (ref 3.5–5.1)
PROT SERPL-MCNC: 7.6 G/DL (ref 6.3–8.2)
PROT UR QL: (no result)
PT BLD: 11 SEC (ref 9–12)
RBC UR QL: 1 /HPF (ref 0–5)
SODIUM SERPL-SCNC: 139 MMOL/L (ref 137–145)
SP GR UR: 1.03 (ref 1–1.03)
SQUAMOUS UR QL AUTO: <1 /HPF (ref 0–4)
UROBILINOGEN UR QL STRIP: 2 MG/DL (ref ?–2)
WBC # BLD AUTO: 8.6 K/UL (ref 3.8–10.6)
WBC # UR AUTO: 3 /HPF (ref 0–5)

## 2020-06-02 PROCEDURE — 80053 COMPREHEN METABOLIC PANEL: CPT

## 2020-06-02 PROCEDURE — 85025 COMPLETE CBC W/AUTO DIFF WBC: CPT

## 2020-06-02 PROCEDURE — 81001 URINALYSIS AUTO W/SCOPE: CPT

## 2020-06-02 PROCEDURE — 36415 COLL VENOUS BLD VENIPUNCTURE: CPT

## 2020-06-02 PROCEDURE — 83690 ASSAY OF LIPASE: CPT

## 2020-06-02 PROCEDURE — 74177 CT ABD & PELVIS W/CONTRAST: CPT

## 2020-06-02 PROCEDURE — 99284 EMERGENCY DEPT VISIT MOD MDM: CPT

## 2020-06-02 PROCEDURE — 82150 ASSAY OF AMYLASE: CPT

## 2020-06-02 PROCEDURE — 74018 RADEX ABDOMEN 1 VIEW: CPT

## 2020-06-02 PROCEDURE — 85610 PROTHROMBIN TIME: CPT

## 2020-06-02 PROCEDURE — 85730 THROMBOPLASTIN TIME PARTIAL: CPT

## 2020-06-02 NOTE — XR
EXAMINATION TYPE: XR KUB

 

DATE OF EXAM: 6/2/2020 7:53 PM

 

CLINICAL HISTORY:  Abdominal pain

 

TECHNIQUE: Single upright image of the abdomen is obtained.

 

COMPARISON: None.

 

FINDINGS: Cholecystectomy clips are seen in the right upper quadrant. No pneumoperitoneum identified.
 Contrast is seen throughout the stomach and loops of small bowel. Lung bases are well aerated. No di
lated large or small bowel. Moderate degenerative change of the lumbosacral junction and hips.

 

IMPRESSION: Nonobstructive bowel gas pattern.

## 2020-06-02 NOTE — ED
General Adult HPI





- General


Chief complaint: Urogenital


Stated complaint: Post Op Bleeding


Time Seen by Provider: 06/02/20 19:10


Source: patient, RN notes reviewed


Mode of arrival: ambulatory


Limitations: no limitations





- History of Present Illness


Initial comments: 





Patient is a pleasant 50-year-old male presenting to the emergency department 

with concerns for hematuria.  Onset of symptoms was just prior to arrival.  No 

dysuria.  No history of similar symptoms previously.  Patient was recently 

discharged from the hospital with diverticulitis and questionable abscess.  No 

nausea or vomiting.  patient diarrhea.  No hematochezia.





- Related Data


                                  Previous Rx's











 Medication  Instructions  Recorded


 


Ciprofloxacin HCl [Cipro] 500 mg PO BID 10 Days #20 tab 05/27/20


 


metroNIDAZOLE [Flagyl] 500 mg PO Q8HR #30 tab 05/27/20











                                    Allergies











Allergy/AdvReac Type Severity Reaction Status Date / Time


 


Penicillins Allergy  Swelling Verified 06/02/20 19:06














Review of Systems


ROS Statement: 


Those systems with pertinent positive or pertinent negative responses have been 

documented in the HPI.





ROS Other: All systems not noted in ROS Statement are negative.


Constitutional: Denies: fever


Eyes: Denies: eye pain


ENT: Denies: ear pain


Respiratory: Denies: cough


Cardiovascular: Denies: chest pain


Endocrine: Denies: fatigue


Gastrointestinal: Reports: as per HPI, abdominal pain (Improving).  Denies: 

nausea, vomiting


Genitourinary: Reports: hematuria


Musculoskeletal: Denies: back pain


Skin: Denies: rash


Neurological: Denies: headache, weakness





Past Medical History


Past Medical History: No Reported History


History of Any Multi-Drug Resistant Organisms: None Reported


Past Surgical History: Cholecystectomy


Additional Past Surgical History / Comment(s): vasectomy


Past Anesthesia/Blood Transfusion Reactions: No Reported Reaction


Past Psychological History: No Psychological Hx Reported


Smoking Status: Former smoker


Past Alcohol Use History: Occasional


Past Drug Use History: None Reported





- Past Family History


  ** Father


Additional Family Medical History / Comment(s): heart issues with blood vessels.





  ** Mother


Family Medical History: No Reported History


Additional Family Medical History / Comment(s): colitis





General Exam


Limitations: no limitations


General appearance: alert, in no apparent distress


Head exam: Present: normocephalic


Eye exam: Present: normal appearance


Neck exam: Present: normal inspection


Respiratory exam: Present: normal lung sounds bilaterally


Cardiovascular Exam: Present: regular rate, normal rhythm


  ** Expanded


Peripheral pulses: 2+: Dorsalis Pedis (R), Dorsalis Pedis (L)


GI/Abdominal exam: Present: soft, tenderness (Mild tenderness right lower 

abdomen).  Absent: distended


Extremities exam: Present: normal inspection


Neurological exam: Present: alert


Psychiatric exam: Present: normal affect, normal mood


Skin exam: Present: normal color





Course


                                   Vital Signs











  06/02/20 06/02/20





  19:03 20:00


 


Temperature 98.3 F 


 


Pulse Rate 103 H 85


 


Respiratory 18 18





Rate  


 


Blood Pressure 133/90 159/109


 


O2 Sat by Pulse 99 97





Oximetry  














Medical Decision Making





- Medical Decision Making





Patient reevaluated and resting comfortably in bed.  Patient symptom-free.  Case

 was discussed with Dr. Aguilar twice including CT results.  He is comfortable 

with discharge and follow-up Thursday as planned.  Patient is also advised that 

he will need to follow-up with urologist.





- Lab Data


Result diagrams: 


                                 06/02/20 19:28





                                 06/02/20 19:28


                                   Lab Results











  06/02/20 06/02/20 06/02/20 Range/Units





  19:28 19:28 19:28 


 


WBC  8.6    (3.8-10.6)  k/uL


 


RBC  5.27    (4.30-5.90)  m/uL


 


Hgb  15.3    (13.0-17.5)  gm/dL


 


Hct  46.6    (39.0-53.0)  %


 


MCV  88.3    (80.0-100.0)  fL


 


MCH  29.0    (25.0-35.0)  pg


 


MCHC  32.8    (31.0-37.0)  g/dL


 


RDW  12.6    (11.5-15.5)  %


 


Plt Count  383    (150-450)  k/uL


 


Neutrophils %  63    %


 


Lymphocytes %  27    %


 


Monocytes %  6    %


 


Eosinophils %  2    %


 


Basophils %  1    %


 


Neutrophils #  5.4    (1.3-7.7)  k/uL


 


Lymphocytes #  2.3    (1.0-4.8)  k/uL


 


Monocytes #  0.5    (0-1.0)  k/uL


 


Eosinophils #  0.1    (0-0.7)  k/uL


 


Basophils #  0.1    (0-0.2)  k/uL


 


PT   11.0   (9.0-12.0)  sec


 


INR   1.1   (<1.2)  


 


APTT   25.5   (22.0-30.0)  sec


 


Sodium     (137-145)  mmol/L


 


Potassium     (3.5-5.1)  mmol/L


 


Chloride     ()  mmol/L


 


Carbon Dioxide     (22-30)  mmol/L


 


Anion Gap     mmol/L


 


BUN     (9-20)  mg/dL


 


Creatinine     (0.66-1.25)  mg/dL


 


Est GFR (CKD-EPI)AfAm     (>60 ml/min/1.73 sqM)  


 


Est GFR (CKD-EPI)NonAf     (>60 ml/min/1.73 sqM)  


 


Glucose     (74-99)  mg/dL


 


Calcium     (8.4-10.2)  mg/dL


 


Total Bilirubin     (0.2-1.3)  mg/dL


 


AST     (17-59)  U/L


 


ALT     (4-49)  U/L


 


Alkaline Phosphatase     ()  U/L


 


Total Protein     (6.3-8.2)  g/dL


 


Albumin     (3.5-5.0)  g/dL


 


Amylase     ()  U/L


 


Lipase     ()  U/L


 


Urine Color    Dark Brown  


 


Urine Appearance    Clear  (Clear)  


 


Urine pH    5.0  (5.0-8.0)  


 


Ur Specific Gravity    1.028  (1.001-1.035)  


 


Urine Protein    1+ H  (Negative)  


 


Urine Glucose (UA)    Negative  (Negative)  


 


Urine Ketones    1+ H  (Negative)  


 


Urine Blood    Negative  (Negative)  


 


Urine Nitrite    Negative  (Negative)  


 


Urine Bilirubin    Negative  (Negative)  


 


Urine Urobilinogen    2.0  (<2.0)  mg/dL


 


Ur Leukocyte Esterase    Small H  (Negative)  


 


Urine RBC    1  (0-5)  /hpf


 


Urine WBC    3  (0-5)  /hpf


 


Ur Squamous Epith Cells    <1  (0-4)  /hpf


 


Hyaline Casts    11 H  (0-2)  /lpf


 


Urine Mucus    Many H  (None)  /hpf














  06/02/20 Range/Units





  19:28 


 


WBC   (3.8-10.6)  k/uL


 


RBC   (4.30-5.90)  m/uL


 


Hgb   (13.0-17.5)  gm/dL


 


Hct   (39.0-53.0)  %


 


MCV   (80.0-100.0)  fL


 


MCH   (25.0-35.0)  pg


 


MCHC   (31.0-37.0)  g/dL


 


RDW   (11.5-15.5)  %


 


Plt Count   (150-450)  k/uL


 


Neutrophils %   %


 


Lymphocytes %   %


 


Monocytes %   %


 


Eosinophils %   %


 


Basophils %   %


 


Neutrophils #   (1.3-7.7)  k/uL


 


Lymphocytes #   (1.0-4.8)  k/uL


 


Monocytes #   (0-1.0)  k/uL


 


Eosinophils #   (0-0.7)  k/uL


 


Basophils #   (0-0.2)  k/uL


 


PT   (9.0-12.0)  sec


 


INR   (<1.2)  


 


APTT   (22.0-30.0)  sec


 


Sodium  139  (137-145)  mmol/L


 


Potassium  3.8  (3.5-5.1)  mmol/L


 


Chloride  104  ()  mmol/L


 


Carbon Dioxide  23  (22-30)  mmol/L


 


Anion Gap  12  mmol/L


 


BUN  15  (9-20)  mg/dL


 


Creatinine  1.15  (0.66-1.25)  mg/dL


 


Est GFR (CKD-EPI)AfAm  85  (>60 ml/min/1.73 sqM)  


 


Est GFR (CKD-EPI)NonAf  73  (>60 ml/min/1.73 sqM)  


 


Glucose  107 H  (74-99)  mg/dL


 


Calcium  9.9  (8.4-10.2)  mg/dL


 


Total Bilirubin  0.6  (0.2-1.3)  mg/dL


 


AST  28  (17-59)  U/L


 


ALT  36  (4-49)  U/L


 


Alkaline Phosphatase  56  ()  U/L


 


Total Protein  7.6  (6.3-8.2)  g/dL


 


Albumin  4.6  (3.5-5.0)  g/dL


 


Amylase  56  ()  U/L


 


Lipase  79  ()  U/L


 


Urine Color   


 


Urine Appearance   (Clear)  


 


Urine pH   (5.0-8.0)  


 


Ur Specific Gravity   (1.001-1.035)  


 


Urine Protein   (Negative)  


 


Urine Glucose (UA)   (Negative)  


 


Urine Ketones   (Negative)  


 


Urine Blood   (Negative)  


 


Urine Nitrite   (Negative)  


 


Urine Bilirubin   (Negative)  


 


Urine Urobilinogen   (<2.0)  mg/dL


 


Ur Leukocyte Esterase   (Negative)  


 


Urine RBC   (0-5)  /hpf


 


Urine WBC   (0-5)  /hpf


 


Ur Squamous Epith Cells   (0-4)  /hpf


 


Hyaline Casts   (0-2)  /lpf


 


Urine Mucus   (None)  /hpf














- Radiology Data


Radiology results: report reviewed (Computed tomography scan abdomen pelvis 

shows slight improvement and hepatic flexure that stranding.  Nodular enlarged 

prostate gland.), image reviewed (KUB shows nonobstructive gas pattern.)





Disposition


Clinical Impression: 


 Hematuria





Disposition: HOME SELF-CARE


Condition: Stable


Instructions (If sedation given, give patient instructions):  Hematuria (ED)


Additional Instructions: 


Please follow-up with primary care physician in the next couple days for 

recheck.  Please follow-up with Dr. Pryor Thursday as scheduled.  Please also 

follow-up with urology, Dr. Rooney, phone number provided.  Return for fever, 

increased pain, increased bleeding, worsening or changing symptoms or other 

concerns.  Please have urologist review CT report.


Is patient prescribed a controlled substance at d/c from ED?: No


Referrals: 


Shyanne John MD [Primary Care Provider] - 1-2 days


Juan Luis Teran MD [STAFF PHYSICIAN] - 1-2 days


Robin Aguilar MD [STAFF PHYSICIAN] - 1-2 days


Time of Disposition: 21:35

## 2020-06-02 NOTE — CT
EXAMINATION TYPE: CT abdomen pelvis w con

 

DATE OF EXAM: 6/2/2020

 

COMPARISON: 5/23/2020

 

HISTORY: Hematuria , recent diverticulitis

 

CT DLP: 1112 mGycm

Automated exposure control for dose reduction was used.

 

TECHNIQUE:  Helical acquisition of images was performed from the lung bases through the pelvis.

 

CONTRAST: 

Performed with Oral Contrast and with IV Contrast, patient injected with 100 mL of Isovue 300.

 

FINDINGS: 

 

LUNG BASES: Multifocal pleural parenchymal scarring and subsegmental atelectasis of the lung bases.

 

LIVER/GB: Hepatomegaly redemonstrated.  Hepatic parenchyma is diffusely hypoattenuated in comparison 
to that of the spleen, most commonly seen in hepatic steatosis. This finding limits evaluation for he
patic masses. 2 small to accurately characterize subcentimeter left hepatic lobe lesion is seen on im
age 11. No intrahepatic biliary ductal dilatation. Gallbladder is surgically absent.

 

PANCREAS: No significant abnormality is seen.

 

SPLEEN: No significant abnormality is seen.

 

ADRENALS: No nodularity or thickening.

 

KIDNEYS: Kidneys enhance and excrete symmetrically without hydronephrosis.

 

FREE AIR:  No free air is visualized.

 

ADENOPATHY:  No greater than 1 cm short axis lymph nodes in the abdomen or pelvis.

 

OSSEOUS STRUCTURES:   Osseous lesions of the right pubic bone may relate to a small osteochondroma or
 bony excrescence. Mild multilevel degenerative changes of the spine. Degenerative change of the sacr
oiliac joints.

 

BOWEL:  There is mild improvement in the pericolonic fat stranding surrounding the hepatic flexure wi
th bowel wall thickening. No pericolonic abscess is seen. Fat stranding extends to the inferior right
 hepatic lobe. No dilated large or small bowel. Appendix is within normal limits. Some fluid-filled l
oops of small bowel containing air-fluid levels suggesting mild degree of ileus.

 

OTHER: There is a fat-containing ventral abdominal hernia. Prostate gland is heterogenous and nodular
 containing central zone calcifications. There is slight hyperemia of the left seminal vesicles in co
mparison to the right. Mild atherosclerosis of the abdominal aorta and its branches.

 

IMPRESSION:

1. SLIGHT IMPROVEMENT IN THE HEPATIC FLEXURE PERICOLONIC FAT STRANDING. CONSIDERATIONS ARE FOR COLITI
S OR DIVERTICULITIS HOWEVER COLONOSCOPY IS RECOMMENDED TO EXCLUDE UNDERLYING NEOPLASM GIVEN THE BOWEL
 WALL THICKENING.

2. NODULAR ENLARGED HETEROGENOUS PROSTATE GLAND WITH SLIGHT HYPEREMIA OF THE LEFT SEMINAL VESICLES IN
 COMPARISON TO THE RIGHT. ALTHOUGH THIS COULD BE AN INCIDENTAL FINDING CORRELATION WITH PSA IS RECOMM
ENDED.

3. HEPATIC STEATOSIS.

## 2020-06-15 ENCOUNTER — HOSPITAL ENCOUNTER (OUTPATIENT)
Dept: HOSPITAL 47 - LABPAT | Age: 50
Discharge: HOME | End: 2020-06-15
Attending: SURGERY
Payer: COMMERCIAL

## 2020-06-15 DIAGNOSIS — Z01.818: Primary | ICD-10-CM

## 2020-06-15 DIAGNOSIS — K57.33: ICD-10-CM

## 2020-06-15 LAB
ANION GAP SERPL CALC-SCNC: 6 MMOL/L
CHLORIDE SERPL-SCNC: 105 MMOL/L (ref 98–107)
CO2 SERPL-SCNC: 28 MMOL/L (ref 22–30)
ERYTHROCYTE [DISTWIDTH] IN BLOOD BY AUTOMATED COUNT: 5.13 M/UL (ref 4.3–5.9)
ERYTHROCYTE [DISTWIDTH] IN BLOOD: 12.7 % (ref 11.5–15.5)
HCT VFR BLD AUTO: 45.7 % (ref 39–53)
HGB BLD-MCNC: 15.2 GM/DL (ref 13–17.5)
MCH RBC QN AUTO: 29.6 PG (ref 25–35)
MCHC RBC AUTO-ENTMCNC: 33.2 G/DL (ref 31–37)
MCV RBC AUTO: 89.1 FL (ref 80–100)
PLATELET # BLD AUTO: 276 K/UL (ref 150–450)
POTASSIUM SERPL-SCNC: 4.6 MMOL/L (ref 3.5–5.1)
SODIUM SERPL-SCNC: 139 MMOL/L (ref 137–145)
WBC # BLD AUTO: 6.2 K/UL (ref 3.8–10.6)

## 2020-06-15 PROCEDURE — 80051 ELECTROLYTE PANEL: CPT

## 2020-06-15 PROCEDURE — 85027 COMPLETE CBC AUTOMATED: CPT

## 2020-06-15 PROCEDURE — 86901 BLOOD TYPING SEROLOGIC RH(D): CPT

## 2020-06-15 PROCEDURE — 36415 COLL VENOUS BLD VENIPUNCTURE: CPT

## 2020-06-15 PROCEDURE — 86900 BLOOD TYPING SEROLOGIC ABO: CPT

## 2020-06-15 PROCEDURE — 86850 RBC ANTIBODY SCREEN: CPT

## 2020-06-25 ENCOUNTER — HOSPITAL ENCOUNTER (OUTPATIENT)
Dept: HOSPITAL 47 - ORWHC2ENDO | Age: 50
Discharge: HOME | End: 2020-06-25
Attending: SURGERY
Payer: COMMERCIAL

## 2020-06-25 VITALS — BODY MASS INDEX: 25.7 KG/M2

## 2020-06-25 VITALS — RESPIRATION RATE: 16 BRPM | TEMPERATURE: 97.1 F

## 2020-06-25 VITALS — SYSTOLIC BLOOD PRESSURE: 155 MMHG | DIASTOLIC BLOOD PRESSURE: 88 MMHG | HEART RATE: 73 BPM

## 2020-06-25 DIAGNOSIS — Z83.79: ICD-10-CM

## 2020-06-25 DIAGNOSIS — Z90.49: ICD-10-CM

## 2020-06-25 DIAGNOSIS — K57.30: Primary | ICD-10-CM

## 2020-06-25 DIAGNOSIS — Z88.0: ICD-10-CM

## 2020-06-25 DIAGNOSIS — K08.89: ICD-10-CM

## 2020-06-25 DIAGNOSIS — Z87.891: ICD-10-CM

## 2020-06-25 DIAGNOSIS — Z98.52: ICD-10-CM

## 2020-06-25 DIAGNOSIS — Z82.49: ICD-10-CM

## 2020-06-25 DIAGNOSIS — Z91.89: ICD-10-CM

## 2020-06-25 PROCEDURE — 45378 DIAGNOSTIC COLONOSCOPY: CPT

## 2020-06-25 NOTE — P.OP
Date of Procedure: 06/25/20


Preoperative Diagnosis: 


Diverticulitis


Postoperative Diagnosis: 


Mild diverticulosis


Procedure(s) Performed: 


Colonoscopy


Anesthesia: MAC


Surgeon: Robin Aguilar


Pathology: none sent


Condition: stable


Disposition: PACU


Description of Procedure: 


Patient's placed on the endoscopy table in the lateral position.  He received IV

sedation.  Digital rectal exam was performed which revealed no rales.  The 

possible colonoscope was then placed patient anus and passed rotator entire 

colon.  The ileocecal valve was visually's.  The cecum and ascending colon.  

Normal.  At the level of the hepatic flexure the previously seen information on 

CAT scan appeared to be resolved.  There was a few scattered diverticula.  Scope

was then brought back and the remainder of the transverse colon appeared normal.

 In the descending; a few scattered diverticula.  The scope was then brought 

back the rectum and this appeared normal.  Scope withdrawn for patient.

## 2020-06-25 NOTE — P.GSHP
History of Present Illness


H&P Date: 06/25/20


Chief Complaint: Diverticulitis





This a 50-year-old male recent hospital admission for diverticulitis.  Patient 

rents today for colonoscopy.





Past Medical History


Past Medical History: No Reported History


Additional Past Medical History / Comment(s): diverticulitis with abscess


History of Any Multi-Drug Resistant Organisms: None Reported


Past Surgical History: Cholecystectomy


Additional Past Surgical History / Comment(s): vasectomy


Past Anesthesia/Blood Transfusion Reactions: Previous Problems w/ Anesthesia


Additional Past Anesthesia/Blood Transfusion Reaction / Comment(s): difficult 

intubation with cholecystectomy intubated through nose


Smoking Status: Former smoker





- Past Family History


  ** Father


Family Medical History: Hypertension


Additional Family Medical History / Comment(s): heart issues with blood vessels.





  ** Mother


Family Medical History: No Reported History


Additional Family Medical History / Comment(s): colitis





Medications and Allergies


                                Home Medications











 Medication  Instructions  Recorded  Confirmed  Type


 


No Known Home Medications  06/23/20 06/23/20 History








                                    Allergies











Allergy/AdvReac Type Severity Reaction Status Date / Time


 


Penicillins Allergy  Swelling Verified 06/25/20 08:53














Surgical - Exam


                                   Vital Signs











Temp Pulse Resp BP Pulse Ox


 


 97.1 F L  71   16   141/91   98 


 


 06/25/20 09:09  06/25/20 09:09  06/25/20 09:09  06/25/20 09:09  06/25/20 09:09














- General


well developed, well nourished, no distress





- Eyes


PERRL





- ENT


normal pinna





- Neck


no masses





- Respiratory


normal expansion





- Cardiovascular


Rhythm: regular





- Abdomen


Abdomen: soft, non tender





Assessment and Plan


Assessment: 





History of diverticulitis.  We'll perform colonoscopy.

## 2020-06-26 ENCOUNTER — HOSPITAL ENCOUNTER (INPATIENT)
Dept: HOSPITAL 47 - 2ORMAIN | Age: 50
LOS: 4 days | Discharge: HOME | DRG: 330 | End: 2020-06-30
Attending: SURGERY | Admitting: SURGERY
Payer: COMMERCIAL

## 2020-06-26 VITALS — BODY MASS INDEX: 25.7 KG/M2

## 2020-06-26 DIAGNOSIS — E87.1: ICD-10-CM

## 2020-06-26 DIAGNOSIS — Z88.0: ICD-10-CM

## 2020-06-26 DIAGNOSIS — K57.32: Primary | ICD-10-CM

## 2020-06-26 DIAGNOSIS — Z90.49: ICD-10-CM

## 2020-06-26 DIAGNOSIS — I10: ICD-10-CM

## 2020-06-26 DIAGNOSIS — K43.6: ICD-10-CM

## 2020-06-26 DIAGNOSIS — Z82.49: ICD-10-CM

## 2020-06-26 DIAGNOSIS — D72.829: ICD-10-CM

## 2020-06-26 DIAGNOSIS — E66.9: ICD-10-CM

## 2020-06-26 DIAGNOSIS — Z87.891: ICD-10-CM

## 2020-06-26 LAB
ANION GAP SERPL CALC-SCNC: 6 MMOL/L
BASOPHILS # BLD AUTO: 0 K/UL (ref 0–0.2)
BASOPHILS NFR BLD AUTO: 0 %
BUN SERPL-SCNC: 17 MG/DL (ref 9–20)
CALCIUM SPEC-MCNC: 9.2 MG/DL (ref 8.4–10.2)
CHLORIDE SERPL-SCNC: 100 MMOL/L (ref 98–107)
CO2 SERPL-SCNC: 30 MMOL/L (ref 22–30)
EOSINOPHIL # BLD AUTO: 0 K/UL (ref 0–0.7)
EOSINOPHIL NFR BLD AUTO: 0 %
ERYTHROCYTE [DISTWIDTH] IN BLOOD BY AUTOMATED COUNT: 4.92 M/UL (ref 4.3–5.9)
ERYTHROCYTE [DISTWIDTH] IN BLOOD: 12.8 % (ref 11.5–15.5)
GLUCOSE SERPL-MCNC: 131 MG/DL (ref 74–99)
HCT VFR BLD AUTO: 44 % (ref 39–53)
HGB BLD-MCNC: 14.7 GM/DL (ref 13–17.5)
LYMPHOCYTES # SPEC AUTO: 0.4 K/UL (ref 1–4.8)
LYMPHOCYTES NFR SPEC AUTO: 3 %
MCH RBC QN AUTO: 29.8 PG (ref 25–35)
MCHC RBC AUTO-ENTMCNC: 33.4 G/DL (ref 31–37)
MCV RBC AUTO: 89.4 FL (ref 80–100)
MONOCYTES # BLD AUTO: 0.8 K/UL (ref 0–1)
MONOCYTES NFR BLD AUTO: 6 %
NEUTROPHILS # BLD AUTO: 12.2 K/UL (ref 1.3–7.7)
NEUTROPHILS NFR BLD AUTO: 90 %
PLATELET # BLD AUTO: 237 K/UL (ref 150–450)
POTASSIUM SERPL-SCNC: 4.9 MMOL/L (ref 3.5–5.1)
SODIUM SERPL-SCNC: 136 MMOL/L (ref 137–145)
WBC # BLD AUTO: 13.6 K/UL (ref 3.8–10.6)

## 2020-06-26 PROCEDURE — 94640 AIRWAY INHALATION TREATMENT: CPT

## 2020-06-26 PROCEDURE — 80048 BASIC METABOLIC PNL TOTAL CA: CPT

## 2020-06-26 PROCEDURE — 85025 COMPLETE CBC W/AUTO DIFF WBC: CPT

## 2020-06-26 PROCEDURE — 86900 BLOOD TYPING SEROLOGIC ABO: CPT

## 2020-06-26 PROCEDURE — 0DBU0ZZ EXCISION OF OMENTUM, OPEN APPROACH: ICD-10-PCS

## 2020-06-26 PROCEDURE — 88305 TISSUE EXAM BY PATHOLOGIST: CPT

## 2020-06-26 PROCEDURE — 86901 BLOOD TYPING SEROLOGIC RH(D): CPT

## 2020-06-26 PROCEDURE — 0WQF0ZZ REPAIR ABDOMINAL WALL, OPEN APPROACH: ICD-10-PCS

## 2020-06-26 PROCEDURE — 88307 TISSUE EXAM BY PATHOLOGIST: CPT

## 2020-06-26 PROCEDURE — 81001 URINALYSIS AUTO W/SCOPE: CPT

## 2020-06-26 PROCEDURE — 80053 COMPREHEN METABOLIC PANEL: CPT

## 2020-06-26 PROCEDURE — 86850 RBC ANTIBODY SCREEN: CPT

## 2020-06-26 PROCEDURE — 0DBF0ZZ EXCISION OF RIGHT LARGE INTESTINE, OPEN APPROACH: ICD-10-PCS

## 2020-06-26 RX ADMIN — HEPARIN SODIUM SCH UNIT: 5000 INJECTION, SOLUTION INTRAVENOUS; SUBCUTANEOUS at 20:48

## 2020-06-26 RX ADMIN — POTASSIUM CHLORIDE SCH MLS: 14.9 INJECTION, SOLUTION INTRAVENOUS at 06:20

## 2020-06-26 RX ADMIN — KETOROLAC TROMETHAMINE SCH MG: 30 INJECTION, SOLUTION INTRAMUSCULAR at 17:33

## 2020-06-26 RX ADMIN — HYDROMORPHONE HYDROCHLORIDE PRN MG: 1 INJECTION, SOLUTION INTRAMUSCULAR; INTRAVENOUS; SUBCUTANEOUS at 09:28

## 2020-06-26 RX ADMIN — SODIUM CHLORIDE PRN MLS: 9 INJECTION, SOLUTION INTRAVENOUS at 10:45

## 2020-06-26 RX ADMIN — DEXTROSE MONOHYDRATE, SODIUM CHLORIDE, AND POTASSIUM CHLORIDE SCH MLS/HR: 50; 4.5; 1.49 INJECTION, SOLUTION INTRAVENOUS at 14:29

## 2020-06-26 RX ADMIN — KETOROLAC TROMETHAMINE SCH MG: 30 INJECTION, SOLUTION INTRAMUSCULAR at 09:37

## 2020-06-26 RX ADMIN — SODIUM CHLORIDE PRN MLS: 9 INJECTION, SOLUTION INTRAVENOUS at 08:57

## 2020-06-26 RX ADMIN — DEXTROSE MONOHYDRATE, SODIUM CHLORIDE, AND POTASSIUM CHLORIDE SCH MLS/HR: 50; 4.5; 1.49 INJECTION, SOLUTION INTRAVENOUS at 20:47

## 2020-06-26 RX ADMIN — KETOROLAC TROMETHAMINE SCH MG: 30 INJECTION, SOLUTION INTRAMUSCULAR at 23:27

## 2020-06-26 RX ADMIN — HYDROMORPHONE HYDROCHLORIDE PRN MG: 1 INJECTION, SOLUTION INTRAMUSCULAR; INTRAVENOUS; SUBCUTANEOUS at 09:03

## 2020-06-26 RX ADMIN — KETOROLAC TROMETHAMINE SCH: 30 INJECTION, SOLUTION INTRAMUSCULAR at 14:01

## 2020-06-26 RX ADMIN — HYDROMORPHONE HYDROCHLORIDE PRN MG: 1 INJECTION, SOLUTION INTRAMUSCULAR; INTRAVENOUS; SUBCUTANEOUS at 09:20

## 2020-06-26 RX ADMIN — KETOROLAC TROMETHAMINE SCH: 30 INJECTION, SOLUTION INTRAMUSCULAR at 13:59

## 2020-06-26 NOTE — CONS
CONSULTATION



DATE OF SERVICE:

06/26/2020



REASON FOR CONSULTATION:

Advice regarding cholecystomy and other medical issues, requested by Dr. Aguilar.



HISTORY OF PRESENT ILLNESS:

This 50-year-old gentleman with a past medical history of  had diverticulitis 
with

diverticular abscess.  The patient underwent partial right colectomy at hepatic 
flexure

and partial omentectomy, repair of incarcerated hernia by Dr. Aguilar. The 
patient is

being closely monitored. There is no history of any chest pain.  No history of

palpitation, headache, loss of consciousness, seizures, nausea, vomiting, 
diarrhea,

fever.



The patient is followed by Dr. John in the outpatient setting.



PAST MEDICAL HISTORY:

Diverticulosis abscesses, history of cholecystectomy, vasectomy.



HOME MEDICATIONS:

None.



ALLERGIES:

PENICILLIN.



FAMILY HISTORY:

History of heart disease.



SOCIAL HISTORY:

Previous smoking. Occasional alcohol intake.



REVIEW OF SYSTEMS:

ENT: No diminished hearing. No diminished vision.

CARDIOVASCULAR SYSTEM: No angina, palpitations.

RESPIRATORY SYSTEM: No cough, hemoptysis.

GI: As mentioned earlier.

: No dysuria or retention.

NERVOUS SYSTEM: No numbness, weakness.

ALLERGY/IMMUNOLOGY: No asthma, hayfever.

MUSCULOSKELETAL: As mentioned earlier.

HEMATOLOGY/ONCOLOGY: No history of anemia.

ENDOCRINE: No history of diabetes, hypothyroidism.

CONSTITUTIONAL: As mentioned earlier.

DERMATOLOGY: Negative.

RHEUMATOLOGY: Negative.

PSYCHIATRY: As mentioned earlier.



PHYSICAL EXAMINATION:

Patient is alert, oriented x3.  Pulse 83.  Blood pressure 128/78, respiration 
16,

temperature 97.7, pulse ox 96% on room air.

HEENT: Conjunctivae normal.

NECK: No jugular venous distention.

CARDIOVASCULAR SYSTEM:  S1, S2 muffled. No S3. No S4.

RESPIRATORY SYSTEM: Breath sounds diminished at the bases.  No rhonchi. No 
crackles.

ABDOMEN: Soft, obese. Status post surgery.

LEGS: No edema. No swelling.

NERVOUS SYSTEM: No focal deficit.



LABS:

CBC within normal limits.  UA noted.  The preoperative labs and the coags are 
normal.



ASSESSMENT:

1. Status post partial right colectomy at hepatic flexure, partial omentectomy 
and

    repair of incarcerated ventral hernia.

2. History of diverticulitis with abscess.

3. Cholecystectomy.

4. Remote history of nicotine dependence.



RECOMMENDATIONS AND DISCUSSION:

In this 50-year-old gentleman who presented after surgery, at this time patient 
appears

to be medically stable. I recommend to continue current medications, continue 
with the

monitoring, symptomatic treatment. DVT prophylaxis.  Otherwise, proton pump 
inhibitors.

Will follow the patient closely.  Incentive spirometry.  The patient may be 
asked to

follow with Dr. John closely after discharge.



Thank you, Dr. Aguilar, for letting us participate in the care of this patient.





MMODL / IJN: [id number] / Job#: [id number]

MTDD

## 2020-06-26 NOTE — P.ANPRN
Procedure Note - Anesthesia





- Epidural/Spinal


  ** Epidural Continuous


Time Out Performed: Yes


Date of Procedure: 06/26/20


Procedure Start Time: 06:45


Procedure Stop Time: 06:50


Location of Patient: PreOp


Indication: Acute Post-Operative Pain, Requested by Surgeon


Sedation Type: Sedate with meaningful contact maintained


Preparation: Sterile Dressing


Position: Sitting


Catheter: Indwelling


Needle Guage: 18


Injectate: Test Dose Lidocaine1.5% w/1:200,000 epi


Blood Aspirated: No


Pain Paresthesia on Injection Noted: No


Events: Uneventful and Well Tolerated

## 2020-06-26 NOTE — P.OP
Date of Procedure: 06/26/20


Preoperative Diagnosis: 


Colonic abscess


Postoperative Diagnosis: 


History of colonic abscess





Ventral hernia incarcerated


Procedure(s) Performed: 


Partial right colectomy at hepatic flexure





Partial omentectomy





Repair of incarcerated ventral hernia


Anesthesia: UMA


Surgeon: Robin Aguilar


Estimated Blood Loss (ml): 50


Pathology: other (Hepatic flexure,Omentum)


Condition: stable


Disposition: PACU


Description of Procedure: 


The patient's placed on the operating table in the supine position.  He received

general anesthesia.  His abdomen was prepped and draped usual sterile fashion.  

The abdomen was entered through a upper midline incision.  The patient had an 

incarcerated ventral hernia located just below the umbilicus.  The incarcerated 

omentum was dissected free from the hernia The Bookwalter retractors placed a 

wound.  The right colon was examined.  In the level of the proximal transverse 

colon appeared to be some inflammatory changes with the omentum stuck on the 

colon.  Due to previous history of abscess in this area.  Site perform a limited

hepatic flexure proximal transverse colon resection.  The distal right colon and

the mid transverse colon was transected with the SHERIDAN stapler.  Using the Enseal 

device the mesentery the bowel was divided.  The specimens to pathology.  A 

side-to-side functional end-to-end staple anastomosis was then created between 

the proximal and distal colon using the SHERIDAN and TA stapler.  A 3-0 GI silk 

sutures using a crotch stitch.  The abdomen was irrigated there is no bleeding 

seen.  Portion of omentum appeared nonviable and this was transected with the 

Enseal device.  The omentum was sent to pathology.  No other bleeding was seen. 

The fascia was then closed with looped PDS suture.  The hernia was repaired at 

the time of fashion closure.  The skin was stapled.  Patient top she will was 

sent to recovery room in stable condition.

## 2020-06-26 NOTE — P.GSHP
History of Present Illness


H&P Date: 06/26/20


Chief Complaint: Diverticulitis





This a 50-year-old male who had a recent hospital for diverticulitis with 

abscess.  Patient rents today for colonic resection.





Past Medical History


Past Medical History: No Reported History


Additional Past Medical History / Comment(s): diverticulitis with abscess


History of Any Multi-Drug Resistant Organisms: None Reported


Past Surgical History: Cholecystectomy


Additional Past Surgical History / Comment(s): vasectomy


Past Anesthesia/Blood Transfusion Reactions: Previous Problems w/ Anesthesia


Additional Past Anesthesia/Blood Transfusion Reaction / Comment(s): difficult 

intubation with cholecystectomy intubated through nose


Smoking Status: Former smoker





- Past Family History


  ** Father


Family Medical History: Hypertension


Additional Family Medical History / Comment(s): heart issues with blood vessels.





  ** Mother


Family Medical History: No Reported History


Additional Family Medical History / Comment(s): colitis





Medications and Allergies


                                Home Medications











 Medication  Instructions  Recorded  Confirmed  Type


 


No Known Home Medications  06/23/20 06/26/20 History








                                    Allergies











Allergy/AdvReac Type Severity Reaction Status Date / Time


 


Penicillins Allergy  Swelling Verified 06/26/20 05:54














Surgical - Exam


                                   Vital Signs











Temp Pulse Resp BP Pulse Ox


 


 96.8 F L  78   16   130/83   97 


 


 06/26/20 05:46  06/26/20 05:46  06/26/20 05:46  06/26/20 05:46  06/26/20 05:46














- General


well developed, well nourished, no distress





- Eyes


PERRL





- ENT


normal pinna





- Neck


no masses





- Respiratory


normal expansion





- Cardiovascular


Rhythm: regular





- Abdomen





Tender right upper quadrant


Abdomen: soft





Results





- Imaging


CT scan - abdomen: report reviewed (Evidence of colonic abscess and inflammation

near hepatic flexure)





Assessment and Plan


Assessment: 





History of diverticulitis with abscess.  Patient will undergo colonic resection 

today.  He is aware the risk of colostomy wound infection bleeding.

## 2020-06-27 LAB
ALBUMIN SERPL-MCNC: 3.6 G/DL (ref 3.5–5)
ALP SERPL-CCNC: 39 U/L (ref 38–126)
ALT SERPL-CCNC: 38 U/L (ref 4–49)
ANION GAP SERPL CALC-SCNC: 4 MMOL/L
AST SERPL-CCNC: 73 U/L (ref 17–59)
BUN SERPL-SCNC: 8 MG/DL (ref 9–20)
CALCIUM SPEC-MCNC: 8.7 MG/DL (ref 8.4–10.2)
CHLORIDE SERPL-SCNC: 102 MMOL/L (ref 98–107)
CO2 SERPL-SCNC: 28 MMOL/L (ref 22–30)
GLUCOSE SERPL-MCNC: 95 MG/DL (ref 74–99)
PH UR: 5 [PH] (ref 5–8)
POTASSIUM SERPL-SCNC: 4 MMOL/L (ref 3.5–5.1)
PROT SERPL-MCNC: 6.4 G/DL (ref 6.3–8.2)
RBC UR QL: 1 /HPF (ref 0–5)
SODIUM SERPL-SCNC: 134 MMOL/L (ref 137–145)
SP GR UR: 1 (ref 1–1.03)
UROBILINOGEN UR QL STRIP: <2 MG/DL (ref ?–2)
WBC #/AREA URNS HPF: 2 /HPF (ref 0–5)

## 2020-06-27 RX ADMIN — DEXTROSE MONOHYDRATE, SODIUM CHLORIDE, AND POTASSIUM CHLORIDE SCH MLS/HR: 50; 4.5; 1.49 INJECTION, SOLUTION INTRAVENOUS at 20:20

## 2020-06-27 RX ADMIN — KETOROLAC TROMETHAMINE SCH MG: 30 INJECTION, SOLUTION INTRAMUSCULAR at 23:41

## 2020-06-27 RX ADMIN — KETOROLAC TROMETHAMINE SCH MG: 30 INJECTION, SOLUTION INTRAMUSCULAR at 17:09

## 2020-06-27 RX ADMIN — ALVIMOPAN SCH MG: 12 CAPSULE ORAL at 07:30

## 2020-06-27 RX ADMIN — ALVIMOPAN SCH MG: 12 CAPSULE ORAL at 20:20

## 2020-06-27 RX ADMIN — DEXTROSE MONOHYDRATE, SODIUM CHLORIDE, AND POTASSIUM CHLORIDE SCH MLS/HR: 50; 4.5; 1.49 INJECTION, SOLUTION INTRAVENOUS at 13:35

## 2020-06-27 RX ADMIN — DEXTROSE MONOHYDRATE, SODIUM CHLORIDE, AND POTASSIUM CHLORIDE SCH MLS/HR: 50; 4.5; 1.49 INJECTION, SOLUTION INTRAVENOUS at 04:10

## 2020-06-27 RX ADMIN — KETOROLAC TROMETHAMINE SCH: 30 INJECTION, SOLUTION INTRAMUSCULAR at 13:35

## 2020-06-27 RX ADMIN — HEPARIN SODIUM SCH UNIT: 5000 INJECTION, SOLUTION INTRAVENOUS; SUBCUTANEOUS at 20:19

## 2020-06-27 RX ADMIN — POTASSIUM CHLORIDE SCH: 14.9 INJECTION, SOLUTION INTRAVENOUS at 03:33

## 2020-06-27 RX ADMIN — KETOROLAC TROMETHAMINE SCH MG: 30 INJECTION, SOLUTION INTRAMUSCULAR at 06:15

## 2020-06-27 RX ADMIN — HEPARIN SODIUM SCH UNIT: 5000 INJECTION, SOLUTION INTRAVENOUS; SUBCUTANEOUS at 07:30

## 2020-06-27 RX ADMIN — PANTOPRAZOLE SODIUM SCH MG: 40 TABLET, DELAYED RELEASE ORAL at 07:30

## 2020-06-27 RX ADMIN — SODIUM CHLORIDE PRN MLS/HR: 9 INJECTION, SOLUTION INTRAVENOUS at 04:10

## 2020-06-27 NOTE — P.PN
Progress Note - Text





6/27  7455


50-year-old male status post low anterior resection by Dr. Aguilar.  Patient 

has an epidural catheter for postop pain control with the solution running at 8 

mL an hour.  Patient has a VAS of 2 with no motor or sensory deficits.  Patient 

has been ambulating.  Plan to continue epidural infusion

## 2020-06-27 NOTE — P.PN
Subjective


Progress Note Date: 06/27/20


Principal diagnosis: 





History of colonic abscess





The patient's postoperative day 1 from right colectomy.  Patient is doing quite 

well.  





Objective





- Vital Signs


Vital signs: 


                                   Vital Signs











Temp  98.7 F   06/27/20 07:20


 


Pulse  67   06/27/20 07:20


 


Resp  18   06/27/20 07:20


 


BP  124/78   06/27/20 07:20


 


Pulse Ox  92 L  06/27/20 07:20








                                 Intake & Output











 06/26/20 06/27/20 06/27/20





 18:59 06:59 18:59


 


Intake Total 1426.5 2094.667 


 


Output Total 350 2100 


 


Balance 1076.5 -5.333 


 


Intake:   


 


  IV 1426.5  


 


  Intake, IV Titration  2094.667 





  Amount   


 


    D5-0.45% NaCl with KCl  2000 





    20Meq/l 1,000 ml @ 125   





    mls/hr IV .Q8H [facility]ON[facility] Rx#:   





    [id number]   


 


    Ropivacaine 250 mg  94.667 





    Hydromorphone (Pf) 5 mg   





    In Sodium Chloride 0.9%   





    200 ml @ Per Protocol   





    EPIDURAL .Q0M PRN Rx#:   





    [id number]   


 


Output:   


 


  Urine 300 2100 


 


  Estimated Blood Loss 50  


 


Other:   


 


  Voiding Method Indwelling Catheter Indwelling Catheter Indwelling Catheter














- Constitutional


General appearance: Present: cooperative





- Gastrointestinal


Gastrointestinal Comment(s): 





Abdomen soft.  Incision site is clean dry intact





- Labs


CBC & Chem 7: 


                                 06/26/20 14:07





                                 06/26/20 14:07


Labs: 


                  Abnormal Lab Results - Last 24 Hours (Table)











  06/26/20 06/26/20 Range/Units





  14:07 14:07 


 


WBC  13.6 H   (3.8-10.6)  k/uL


 


Neutrophils #  12.2 H   (1.3-7.7)  k/uL


 


Lymphocytes #  0.4 L   (1.0-4.8)  k/uL


 


Sodium   136 L  (137-145)  mmol/L


 


Glucose   131 H  (74-99)  mg/dL














Assessment and Plan


Plan: 





Status post right colectomy.  Patient will continue supportive care.  We will 

increase his diet tomorrow.

## 2020-06-27 NOTE — PN
PROGRESS NOTE



DATE OF SERVICE:

06/27/2020



This 50-year-old gentleman who was admitted with right partial colectomy, is improving

significantly.  No chest pain.  No palpitations.  No fever.



PHYSICAL EXAMINATION:

Alert and oriented x2.  Pulse 68, blood pressure 146/87, respirations 16, temp 99.2,

pulse ox 98% on room.

HEENT:  Conjunctivae normal. Oral mucosa moist.

NECK:  No jugular venous distention.  No lymph node enlargement.

CARDIOVASCULAR:  S1, S2, muffled.  No S3, no S4,

RESPIRATORY: Diminished breath sounds at the bases. A few rhonchi, no crackles.

ABDOMEN: Soft, nontender.  Status post surgery.

LEGS: No edema, no swelling.

NERVOUS SYSTEM:  No focal deficits.



LABS:

WBC 13.6, sodium is 130.



ASSESSMENT:

1. Status post partial right colectomy of hepatic flexure, partial omentectomy and

    repair of incarcerated ventral hernia.

2. History of diverticulitis with abscess.

3. Cholecystectomy.

4. Remote history of nicotine dependence.

5. Hyponatremia.

6. Increased WBC.

7. FULL CODE.



RECOMMENDATIONS AND DISCUSSION:

In this 50-year-old gentleman admitted with multiple medical issues, we will monitor

the patient closely, continue the current management and symptomatic treatment.

Otherwise, at this time I recommend incentive spirometry, repeat labs. Prognosis

guarded.  Further recommendations to follow.





MMODL / IJN: [id number] / Job#: [id number]

## 2020-06-28 LAB
BASOPHILS # BLD AUTO: 0 K/UL (ref 0–0.2)
BASOPHILS NFR BLD AUTO: 0 %
EOSINOPHIL # BLD AUTO: 0.1 K/UL (ref 0–0.7)
EOSINOPHIL NFR BLD AUTO: 1 %
ERYTHROCYTE [DISTWIDTH] IN BLOOD BY AUTOMATED COUNT: 4.21 M/UL (ref 4.3–5.9)
ERYTHROCYTE [DISTWIDTH] IN BLOOD: 12.8 % (ref 11.5–15.5)
HCT VFR BLD AUTO: 38.2 % (ref 39–53)
HGB BLD-MCNC: 12.7 GM/DL (ref 13–17.5)
LYMPHOCYTES # SPEC AUTO: 1.2 K/UL (ref 1–4.8)
LYMPHOCYTES NFR SPEC AUTO: 16 %
MCH RBC QN AUTO: 30.1 PG (ref 25–35)
MCHC RBC AUTO-ENTMCNC: 33.1 G/DL (ref 31–37)
MCV RBC AUTO: 90.8 FL (ref 80–100)
MONOCYTES # BLD AUTO: 0.5 K/UL (ref 0–1)
MONOCYTES NFR BLD AUTO: 6 %
NEUTROPHILS # BLD AUTO: 5.7 K/UL (ref 1.3–7.7)
NEUTROPHILS NFR BLD AUTO: 75 %
PLATELET # BLD AUTO: 197 K/UL (ref 150–450)
WBC # BLD AUTO: 7.5 K/UL (ref 3.8–10.6)

## 2020-06-28 RX ADMIN — POTASSIUM CHLORIDE SCH: 14.9 INJECTION, SOLUTION INTRAVENOUS at 05:53

## 2020-06-28 RX ADMIN — PANTOPRAZOLE SODIUM SCH MG: 40 TABLET, DELAYED RELEASE ORAL at 09:29

## 2020-06-28 RX ADMIN — HEPARIN SODIUM SCH UNIT: 5000 INJECTION, SOLUTION INTRAVENOUS; SUBCUTANEOUS at 09:29

## 2020-06-28 RX ADMIN — DEXTROSE MONOHYDRATE, SODIUM CHLORIDE, AND POTASSIUM CHLORIDE SCH MLS/HR: 50; 4.5; 1.49 INJECTION, SOLUTION INTRAVENOUS at 20:27

## 2020-06-28 RX ADMIN — HEPARIN SODIUM SCH UNIT: 5000 INJECTION, SOLUTION INTRAVENOUS; SUBCUTANEOUS at 20:27

## 2020-06-28 RX ADMIN — SODIUM CHLORIDE PRN MLS/HR: 9 INJECTION, SOLUTION INTRAVENOUS at 05:53

## 2020-06-28 RX ADMIN — ALVIMOPAN SCH MG: 12 CAPSULE ORAL at 09:29

## 2020-06-28 RX ADMIN — ALVIMOPAN SCH MG: 12 CAPSULE ORAL at 20:27

## 2020-06-28 RX ADMIN — DEXTROSE MONOHYDRATE, SODIUM CHLORIDE, AND POTASSIUM CHLORIDE SCH MLS/HR: 50; 4.5; 1.49 INJECTION, SOLUTION INTRAVENOUS at 13:48

## 2020-06-28 RX ADMIN — DEXTROSE MONOHYDRATE, SODIUM CHLORIDE, AND POTASSIUM CHLORIDE SCH MLS/HR: 50; 4.5; 1.49 INJECTION, SOLUTION INTRAVENOUS at 05:52

## 2020-06-28 NOTE — P.PN
Progress Note - Text


Progress Note Date: 06/28/20





Patient is doing well.  He had a bowel movement yesterday.





On exam her vital signs are stable.  Abdomen soft.





Status post partial right colectomy.  Patient will have his diet advanced.

## 2020-06-29 LAB
BASOPHILS # BLD AUTO: 0 K/UL (ref 0–0.2)
BASOPHILS NFR BLD AUTO: 0 %
EOSINOPHIL # BLD AUTO: 0.2 K/UL (ref 0–0.7)
EOSINOPHIL NFR BLD AUTO: 2 %
ERYTHROCYTE [DISTWIDTH] IN BLOOD BY AUTOMATED COUNT: 4.39 M/UL (ref 4.3–5.9)
ERYTHROCYTE [DISTWIDTH] IN BLOOD: 12.7 % (ref 11.5–15.5)
HCT VFR BLD AUTO: 39.4 % (ref 39–53)
HGB BLD-MCNC: 13.3 GM/DL (ref 13–17.5)
LYMPHOCYTES # SPEC AUTO: 1.3 K/UL (ref 1–4.8)
LYMPHOCYTES NFR SPEC AUTO: 18 %
MCH RBC QN AUTO: 30.2 PG (ref 25–35)
MCHC RBC AUTO-ENTMCNC: 33.6 G/DL (ref 31–37)
MCV RBC AUTO: 89.7 FL (ref 80–100)
MONOCYTES # BLD AUTO: 0.4 K/UL (ref 0–1)
MONOCYTES NFR BLD AUTO: 6 %
NEUTROPHILS # BLD AUTO: 5.2 K/UL (ref 1.3–7.7)
NEUTROPHILS NFR BLD AUTO: 73 %
PLATELET # BLD AUTO: 230 K/UL (ref 150–450)
WBC # BLD AUTO: 7.2 K/UL (ref 3.8–10.6)

## 2020-06-29 RX ADMIN — HYDROCODONE BITARTRATE AND ACETAMINOPHEN PRN EACH: 7.5; 325 TABLET ORAL at 11:32

## 2020-06-29 RX ADMIN — ALVIMOPAN SCH MG: 12 CAPSULE ORAL at 20:51

## 2020-06-29 RX ADMIN — DEXTROSE MONOHYDRATE, SODIUM CHLORIDE, AND POTASSIUM CHLORIDE SCH MLS/HR: 50; 4.5; 1.49 INJECTION, SOLUTION INTRAVENOUS at 17:29

## 2020-06-29 RX ADMIN — ALVIMOPAN SCH MG: 12 CAPSULE ORAL at 07:26

## 2020-06-29 RX ADMIN — HYDROCODONE BITARTRATE AND ACETAMINOPHEN PRN EACH: 7.5; 325 TABLET ORAL at 20:51

## 2020-06-29 RX ADMIN — HEPARIN SODIUM SCH UNIT: 5000 INJECTION, SOLUTION INTRAVENOUS; SUBCUTANEOUS at 07:26

## 2020-06-29 RX ADMIN — POTASSIUM CHLORIDE SCH: 14.9 INJECTION, SOLUTION INTRAVENOUS at 04:43

## 2020-06-29 RX ADMIN — PANTOPRAZOLE SODIUM SCH MG: 40 TABLET, DELAYED RELEASE ORAL at 07:26

## 2020-06-29 RX ADMIN — HEPARIN SODIUM SCH UNIT: 5000 INJECTION, SOLUTION INTRAVENOUS; SUBCUTANEOUS at 20:51

## 2020-06-29 NOTE — PN
PROGRESS NOTE



DATE OF SERVICE:

06/29/2020



This 50-year-old gentleman who was admitted after partial right colectomy is being

closely monitored.  No chest pain.  No palpitations.  No fever.



PHYSICAL EXAMINATION:

Alert and oriented x3. Pulse is 80, blood pressure 150/69, respiration 18, temperature

98.6, pulse ox 96% on room air.

HEENT: Conjunctivae normal.

NECK: No jugular venous distention.

CARDIOVASCULAR SYSTEM:  S1, S2 muffled.

RESPIRATORY SYSTEM: Breath sounds diminished at the bases.  No rhonchi. No crackles.

ABDOMEN: Soft, non-tender.

LEGS: No edema. No swelling.

NERVOUS SYSTEM: No focal deficit.



LABS:

CBC within normal limits.



ASSESSMENT:

1. Status post partial right colectomy for hepatic flexure and partial omentectomy for

    repair of incarcerated ventral hernia.

2. History of diverticulitis with abscesses.

3. Hypertension.

4. Cholecystectomy.

5. Remote history of nicotine dependence.

6. Hyponatremia.

7. Increased white count, reactive, improved.

8. FULL CODE.



RECOMMENDATIONS AND DISCUSSION:

I recommend to continue current medications, continue with the monitoring, symptomatic

treatment. Continue with the pain management. Closely follow with Surgery. Continue the

rest of the medications.  DVT prophylaxis.  Further recommendations to follow.





MMODL / IJN: [id number] / Job#: [id number]

## 2020-06-29 NOTE — P.PN
Progress Note - Text





6/28 1900


50-year-old male status post low anterior resection by Dr. Aguilar.  Patient 

has an epidural catheter for postop pain control with solution running at 8 mL 

an hour with a VAS of 5.  No motor or sensory deficit noted ambulating.  I asked

the nurse to increase the rate to 10 mL an hour

## 2020-06-29 NOTE — PN
PROGRESS NOTE



DATE OF SERVICE:

06/28/2020



This 50-year-old gentleman who was admitted after partial colectomy of hepatic flexure

is improving significantly.  No chest pain.  No palpitations.  No fever.



PHYSICAL EXAMINATION:

On exam, alert and oriented x3.  Pulse is 83, blood pressure 185/96, respirations 16,

temperature 98.9, pulse ox 96% on room air.

HEENT:  Conjunctivae normal.

NECK: No jugular venous distention.

CARDIOVASCULAR:  S1, S2 muffled.

RESPIRATORY: Breath sounds diminished at the bases. A few scattered rhonchi and

crackles.

ABDOMEN: Soft, status post surgery.

LEGS: No edema, no swelling.

NERVOUS SYSTEM:  No focal deficits.



LABS:

WBC 7.5, hemoglobin 12.7. UA noted.



ASSESSMENT:

1. Status post partial right colectomy of hepatic flexure and partial omentectomy for

    repair of incarcerated ventral hernia.

2. History of diverticulitis with abscess.

3. Hypertension.

4. Cholecystectomy.

5. Remote history of nicotine dependence.

6. Hyponatremia.

7. Increased WBC reactive, improved.

8. FULL CODE.



RECOMMENDATIONS AND DISCUSSION:

Recommend to continue current medications, continue with monitoring and symptomatic

treatment. Otherwise, at this time I would recommend to follow the patient closely and

continue on IV fluids and monitor blood pressure closely and further recommendations to

follow.





MMODL / IJN: [id number] / Job#: [id number]

## 2020-06-29 NOTE — P.PN
Progress Note - Text





6/29 645am


50-year-old male status post low anterior resection by Dr. Aguilar.  Patient 

has an epidural catheter for postop pain control with the solution running at 10

mL an hour.  Patient has a VAS of 2 with no motor or sensory deficits.  Step 

very well last night.  Plantar VCD epidural nurse informed

## 2020-06-29 NOTE — P.PN
Progress Note - Text


Progress Note Date: 06/29/20





The patient is resting comfortable in bed.  He is tolerating diet.  He's had 

multiple dominant.





On exam vitals are stable.  Abdomen soft.  Incisions clean and intact





Status post partial right colectomy.  Patient will be discharged home the next 

24-48 hours.

## 2020-06-30 VITALS — RESPIRATION RATE: 16 BRPM

## 2020-06-30 VITALS — HEART RATE: 70 BPM | DIASTOLIC BLOOD PRESSURE: 90 MMHG | SYSTOLIC BLOOD PRESSURE: 156 MMHG | TEMPERATURE: 98.3 F

## 2020-06-30 LAB
BASOPHILS # BLD AUTO: 0 K/UL (ref 0–0.2)
BASOPHILS NFR BLD AUTO: 0 %
EOSINOPHIL # BLD AUTO: 0.2 K/UL (ref 0–0.7)
EOSINOPHIL NFR BLD AUTO: 4 %
ERYTHROCYTE [DISTWIDTH] IN BLOOD BY AUTOMATED COUNT: 4.82 M/UL (ref 4.3–5.9)
ERYTHROCYTE [DISTWIDTH] IN BLOOD: 13 % (ref 11.5–15.5)
HCT VFR BLD AUTO: 42.5 % (ref 39–53)
HGB BLD-MCNC: 14.3 GM/DL (ref 13–17.5)
LYMPHOCYTES # SPEC AUTO: 1.4 K/UL (ref 1–4.8)
LYMPHOCYTES NFR SPEC AUTO: 20 %
MCH RBC QN AUTO: 29.8 PG (ref 25–35)
MCHC RBC AUTO-ENTMCNC: 33.7 G/DL (ref 31–37)
MCV RBC AUTO: 88.3 FL (ref 80–100)
MONOCYTES # BLD AUTO: 0.4 K/UL (ref 0–1)
MONOCYTES NFR BLD AUTO: 6 %
NEUTROPHILS # BLD AUTO: 4.8 K/UL (ref 1.3–7.7)
NEUTROPHILS NFR BLD AUTO: 69 %
PLATELET # BLD AUTO: 311 K/UL (ref 150–450)
WBC # BLD AUTO: 6.9 K/UL (ref 3.8–10.6)

## 2020-06-30 RX ADMIN — HYDROCODONE BITARTRATE AND ACETAMINOPHEN PRN EACH: 7.5; 325 TABLET ORAL at 08:14

## 2020-06-30 RX ADMIN — DEXTROSE MONOHYDRATE, SODIUM CHLORIDE, AND POTASSIUM CHLORIDE SCH MLS/HR: 50; 4.5; 1.49 INJECTION, SOLUTION INTRAVENOUS at 08:08

## 2020-06-30 RX ADMIN — HYDROCODONE BITARTRATE AND ACETAMINOPHEN PRN EACH: 7.5; 325 TABLET ORAL at 13:39

## 2020-06-30 RX ADMIN — HEPARIN SODIUM SCH UNIT: 5000 INJECTION, SOLUTION INTRAVENOUS; SUBCUTANEOUS at 08:07

## 2020-06-30 RX ADMIN — PANTOPRAZOLE SODIUM SCH MG: 40 TABLET, DELAYED RELEASE ORAL at 08:07

## 2020-06-30 RX ADMIN — ALVIMOPAN SCH MG: 12 CAPSULE ORAL at 08:07

## 2020-06-30 RX ADMIN — HYDROCODONE BITARTRATE AND ACETAMINOPHEN PRN EACH: 7.5; 325 TABLET ORAL at 02:02

## 2020-06-30 RX ADMIN — POTASSIUM CHLORIDE SCH: 14.9 INJECTION, SOLUTION INTRAVENOUS at 04:15

## 2020-06-30 NOTE — P.DS
Providers


Date of admission: 


06/26/20 05:31





Expected date of discharge: 06/30/20


Attending physician: 


Robin Aguilar





Consults: 





                                        





06/26/20 08:38


Consult Physician Routine 


   Consulting Provider: Yakov Early


   Consult Reason/Comments: medical mangement


   Do you want consulting provider notified?: Yes











[facility] care physician: 


[facility]





[facility] Course: 





This a 50-year-old male who underwent partial right colectomy for previous 

history of colonic abscess.  Patient did well postoperatively.  Please see 

hospital chart for details.


Procedures: 





Right colectomy


Patient Condition at Discharge: Good





Plan - Discharge Summary


Discharge Rx Participant: No


New Discharge Prescriptions: 


New


   Docusate [Colace] 100 mg PO BID #20 capsule


   HYDROcodone/APAP 5-325MG [Norco 5-325] 1 tab PO Q6HR PRN #10 tab


     PRN Reason: Pain


Discharge Medication List





Docusate [Colace] 100 mg PO BID #20 capsule 06/30/20 [Rx]


HYDROcodone/APAP 5-325MG [Norco 5-325] 1 tab PO Q6HR PRN #10 tab 06/30/20 [Rx]








Follow up Appointment(s)/Referral(s): 


Robin Aguilar MD [STAFF PHYSICIAN] - 1 Week


Patient Instructions/Handouts:  Diverticulitis (DC)

## 2020-06-30 NOTE — P.PN
Subjective


50-year-old male admitted for right the partial colectomy.  Patient is 

clinically doing well tolerating diet well.  Patient is being discharged today 

and there are no major medical issues.  Patient is medically stable to be 

discharged.





Constitutional: Denied any fatigue denied any fever.


Cardio vascular: denied any chest pain, palpitations


Gastrointestinal denied any nausea vomiting


Pulmonary: Denied any shortness of breath cough


Neurologic denied any new focal deficits





All inpatient medications were reviewed and appropriate changes in these 

medications as dictated in the interval history and assessment and plan.








Objective





- Vital Signs


Vital signs: 


                                   Vital Signs











Temp  98.3 F   06/30/20 07:00


 


Pulse  70   06/30/20 07:00


 


Resp  16   06/30/20 07:00


 


BP  156/90   06/30/20 07:00


 


Pulse Ox  95   06/30/20 07:00








                                 Intake & Output











 06/29/20 06/30/20 06/30/20





 18:59 06:59 18:59


 


Intake Total 940  520


 


Output Total 3000  


 


Balance -2060  520


 


Intake:   


 


  IV   400


 


    D5-0.45% NaCl with KCl   400





    20Meq/l 1,000 ml @ 50 mls   





    /hr IV .Q20H [facility] Rx#:   





    [id number]   


 


  Oral 940  120


 


Output:   


 


  Urine 3000  


 


    Uretheral ([place]) 3000  


 


Other:   


 


  Voiding Method Indwelling Catheter  


 


  # Voids  3 














- Exam








PHYSICAL EXAMINATION: 





GENERAL: The patient is alert and oriented x3, not in any acute distress. Well 

developed, well nourished. 


HEENT: Pupils are round and equally reacting to light. EOMI. No scleral icterus.

No conjunctival pallor. Normocephalic, atraumatic. No pharyngeal erythema. No 

thyromegaly. 


CARDIOVASCULAR: S1 and S2 present. No murmurs, rubs, or gallops. 


PULMONARY: Chest is clear to auscultation, no wheezing or crackles. 


ABDOMEN: Soft, nontender, nondistended, normoactive bowel sounds. No palpable 

organomegaly.  Abdominal binder in place


MUSCULOSKELETAL: No joint swelling or deformity.


EXTREMITIES: No cyanosis, clubbing, or pedal edema. 


NEUROLOGICAL: Gross neurological examination did not reveal any focal deficits. 


SKIN: No rashes. 

















- Labs


CBC & Chem 7: 


                                 06/30/20 10:44





                                 06/27/20 16:36





Assessment and Plan


Plan: 


-Status post right colectomy and partial omentectomy with repair of incarcerated

hernia patient is doing well postoperatively is being discharged today


-Leukocytosis reactive in nature on admission resolved at this time


-Obesity





Discharge medications were reviewed and patient is medically stable to be 

discharged

## 2020-10-13 NOTE — P.HPIM
History of Present Illness


48-year-old gentleman with no known sick and past medical history came in with 

complaints of palpitations irregular heartbeat feeling, patient's EKG showed 

normal sinus rhythm patient was lightheaded was about to pass out never had any 

syncopal episode.  Patient doesn't have any previous history of congestive 

heart failure.  Cardiac evaluated the patient and recommended a stress test.  

Patient was started on metoprolol from in ER.  Patient 24-hour monitoring did 

not show any significant rhythm abnormalities because of which metoprolol is 

not being continued particularly because of his mild hypotension.  Patient is a 

smoker requesting 13 patches which will be provided to the patient.  Patient 

underwent stress us if that is negative patient was will be discharged to 

follow Dr. Mark as an outpatient if he continues to have similar episodes 

patient will need Holter monitoring.  Troponins are negative








Review of Systems


REVIEW OF SYSTEMS: 


CONSTITUTIONAL: No fever, no malaise, no fatigue. 


HEENT: No recent visual problems or hearing problems. Denied any sore throat. 


CARDIOVASCULAR: No chest pain, orthopnea, PND, 


PULMONARY: No shortness of breath, no cough, no hemoptysis. 


GASTROINTESTINAL: No diarrhea, no nausea, no vomiting, no abdominal pain. 

Normoactive bowel sounds. 


NEUROLOGICAL: No headaches, no weakness, no numbness. 


HEMATOLOGICAL: Denies any bleeding or petechiae. 


GENITOURINARY: Denies any burning micturition, frequency, or urgency. 


MUSCULOSKELETAL/RHEUMATOLOGICAL: Denies any joint pain, swelling, or any muscle 

pain. 


ENDOCRINE: Denies any polyuria or polydipsia. 





The rest of the 14-point review of systems is negative.











Past Medical History


Past Medical History: No Reported History


History of Any Multi-Drug Resistant Organisms: None Reported


Past Surgical History: Cholecystectomy


Additional Past Surgical History / Comment(s): vasectomy


Past Anesthesia/Blood Transfusion Reactions: No Reported Reaction


Smoking Status: Current every day smoker





- Past Family History


  ** Father


Additional Family Medical History / Comment(s): heart issues with blood vessels.





  ** Mother


Family Medical History: No Reported History





Medications and Allergies


 Home Medications











 Medication  Instructions  Recorded  Confirmed  Type


 


Ibuprofen [Motrin Ib] 400 mg PO Q6HR PRN 01/29/18 01/29/18 History


 


Nicotine 21Mg/24Hr Patch [Habitrol] 1 each TRANSDERM DAILY #14 patch 01/30/18  

Rx











 Allergies











Allergy/AdvReac Type Severity Reaction Status Date / Time


 


Penicillins Allergy  Swelling Verified 01/29/18 21:06














Physical Exam


Vitals: 


 Vital Signs











  Temp Pulse Pulse Pulse Pulse Pulse Resp


 


 01/30/18 11:45  97.6 F   72     18


 


 01/30/18 08:00  98.0 F   55 L     18


 


 01/30/18 04:00        18


 


 01/30/18 03:41  98 F      60  18


 


 01/30/18 00:00        18


 


 01/29/18 22:30     77  81  73  18


 


 01/29/18 21:10        18


 


 01/29/18 21:04  97.4 F L      67  18


 


 01/29/18 20:44  97.8 F  73      18


 


 01/29/18 19:20   74      18


 


 01/29/18 18:10     78  90  73 


 


 01/29/18 17:59  98.1 F  88      20














  BP BP BP BP BP Pulse Ox


 


 01/30/18 11:45   109/71     96


 


 01/30/18 08:00   93/51     96


 


 01/30/18 04:00      


 


 01/30/18 03:41      103/56  94 L


 


 01/30/18 00:00      


 


 01/29/18 22:30    118/72  123/74  129/76  98


 


 01/29/18 21:10      


 


 01/29/18 21:04      143/85  97


 


 01/29/18 20:44  157/94      97


 


 01/29/18 19:20  142/80      98


 


 01/29/18 18:10    153/90   145/87 


 


 01/29/18 17:59  166/93      97








 Intake and Output











 01/29/18 01/30/18 01/30/18





 22:59 06:59 14:59


 


Intake Total 250  


 


Balance 250  


 


Intake:   


 


  Oral 250  


 


Other:   


 


  Voiding Method   Toilet


 


  # Voids  1 


 


  Weight 83.9 kg  











PHYSICAL EXAMINATION: 





GENERAL: The patient is alert and oriented x3, not in any acute distress. Well 

developed, well nourished. 


HEENT: Pupils are round and equally reacting to light. EOMI. No scleral 

icterus. No conjunctival pallor. Normocephalic, atraumatic. No pharyngeal 

erythema. No thyromegaly. 


CARDIOVASCULAR: S1 and S2 present. No murmurs, rubs, or gallops. 


PULMONARY: Chest is clear to auscultation, no wheezing or crackles. 


ABDOMEN: Soft, nontender, nondistended, normoactive bowel sounds. No palpable 

organomegaly. 


MUSCULOSKELETAL: No joint swelling or deformity.


EXTREMITIES: No cyanosis, clubbing, or pedal edema. 


NEUROLOGICAL: Gross neurological examination did not reveal any focal deficits. 


SKIN: No rashes. 











Results


CBC & Chem 7: 


 01/29/18 18:06





 01/29/18 18:06


Labs: 


 Abnormal Lab Results - Last 24 Hours (Table)











  01/29/18 01/30/18 Range/Units





  18:06 06:23 


 


BUN  22 H   (9-20)  mg/dL


 


HDL Cholesterol   35 L  (40-60)  mg/dL














Thrombosis Risk Factor Assmnt





- Choose All That Apply


Each Factor Represents 1 point: Age 41-60 years


Thrombosis Risk Factor Assessment Total Risk Factor Score: 1


Thrombosis Risk Factor Assessment Level: Low Risk





Assessment and Plan


Plan: 


-palpitations and presyncopal episode: Etiology unclear 24-hour monitoring did 

not show any significant abnormality.  Follow with the cardiology and primary 

care physician as an outpatient echocardiogram was often without any 

significant abnormality. unsure whether patient had PVCs are atrial flutter or 

fibrillation.


-ruled out acute coronary event


-ruled out acute coronary ischemia as an etiology for palpitations
No, Declined

## 2021-03-10 ENCOUNTER — HOSPITAL ENCOUNTER (EMERGENCY)
Dept: HOSPITAL 47 - EC | Age: 51
Discharge: HOME | End: 2021-03-10
Payer: COMMERCIAL

## 2021-03-10 VITALS
RESPIRATION RATE: 18 BRPM | HEART RATE: 90 BPM | TEMPERATURE: 99 F | DIASTOLIC BLOOD PRESSURE: 81 MMHG | SYSTOLIC BLOOD PRESSURE: 126 MMHG

## 2021-03-10 DIAGNOSIS — Z87.891: ICD-10-CM

## 2021-03-10 DIAGNOSIS — U07.1: Primary | ICD-10-CM

## 2021-03-10 DIAGNOSIS — Z88.0: ICD-10-CM

## 2021-03-10 LAB
ALBUMIN SERPL-MCNC: 4.4 G/DL (ref 3.5–5)
ALP SERPL-CCNC: 53 U/L (ref 38–126)
ALT SERPL-CCNC: 27 U/L (ref 4–49)
ANION GAP SERPL CALC-SCNC: 9 MMOL/L
AST SERPL-CCNC: 25 U/L (ref 17–59)
BASOPHILS # BLD AUTO: 0.1 K/UL (ref 0–0.2)
BASOPHILS NFR BLD AUTO: 1 %
BUN SERPL-SCNC: 14 MG/DL (ref 9–20)
CALCIUM SPEC-MCNC: 9.7 MG/DL (ref 8.4–10.2)
CHLORIDE SERPL-SCNC: 101 MMOL/L (ref 98–107)
CO2 SERPL-SCNC: 29 MMOL/L (ref 22–30)
EOSINOPHIL # BLD AUTO: 0.1 K/UL (ref 0–0.7)
EOSINOPHIL NFR BLD AUTO: 1 %
ERYTHROCYTE [DISTWIDTH] IN BLOOD BY AUTOMATED COUNT: 5.58 M/UL (ref 4.3–5.9)
ERYTHROCYTE [DISTWIDTH] IN BLOOD: 13.2 % (ref 11.5–15.5)
GLUCOSE SERPL-MCNC: 109 MG/DL (ref 74–99)
HCT VFR BLD AUTO: 48.5 % (ref 39–53)
HGB BLD-MCNC: 16.6 GM/DL (ref 13–17.5)
LYMPHOCYTES # SPEC AUTO: 1.1 K/UL (ref 1–4.8)
LYMPHOCYTES NFR SPEC AUTO: 22 %
MCH RBC QN AUTO: 29.7 PG (ref 25–35)
MCHC RBC AUTO-ENTMCNC: 34.1 G/DL (ref 31–37)
MCV RBC AUTO: 87 FL (ref 80–100)
MONOCYTES # BLD AUTO: 0.4 K/UL (ref 0–1)
MONOCYTES NFR BLD AUTO: 9 %
NEUTROPHILS # BLD AUTO: 3.2 K/UL (ref 1.3–7.7)
NEUTROPHILS NFR BLD AUTO: 65 %
PH UR: 5.5 [PH] (ref 5–8)
PLATELET # BLD AUTO: 196 K/UL (ref 150–450)
POTASSIUM SERPL-SCNC: 3.8 MMOL/L (ref 3.5–5.1)
PROT SERPL-MCNC: 7.4 G/DL (ref 6.3–8.2)
PROT UR QL: (no result)
RBC UR QL: 1 /HPF (ref 0–5)
SODIUM SERPL-SCNC: 139 MMOL/L (ref 137–145)
SP GR UR: 1.03 (ref 1–1.03)
UROBILINOGEN UR QL STRIP: <2 MG/DL (ref ?–2)
WBC # BLD AUTO: 4.8 K/UL (ref 3.8–10.6)
WBC #/AREA URNS HPF: 2 /HPF (ref 0–5)

## 2021-03-10 PROCEDURE — 81001 URINALYSIS AUTO W/SCOPE: CPT

## 2021-03-10 PROCEDURE — 87635 SARS-COV-2 COVID-19 AMP PRB: CPT

## 2021-03-10 PROCEDURE — 36415 COLL VENOUS BLD VENIPUNCTURE: CPT

## 2021-03-10 PROCEDURE — 80053 COMPREHEN METABOLIC PANEL: CPT

## 2021-03-10 PROCEDURE — 99285 EMERGENCY DEPT VISIT HI MDM: CPT

## 2021-03-10 PROCEDURE — 85025 COMPLETE CBC W/AUTO DIFF WBC: CPT

## 2021-03-10 PROCEDURE — 71046 X-RAY EXAM CHEST 2 VIEWS: CPT

## 2021-03-10 NOTE — XR
EXAMINATION TYPE: XR chest 2V

 

DATE OF EXAM: 3/10/2021

 

COMPARISON: 1/29/2018

 

HISTORY: 51-year-old male with shortness of breath, difficulty breathing

 

TECHNIQUE:  PA and lateral views

 

FINDINGS:  

Heart normal size. Aorta and pulmonary vasculature within normal limits. Mild biapical pleural-parenc
hymal scarring. No consolidation or pleural effusion.

 

 

IMPRESSION:  

No acute cardiopulmonary process.

## 2021-03-10 NOTE — ED
SOB HPI





- General


Chief Complaint: Shortness of Breath


Stated Complaint: Cough, Fever, SOB


Time Seen by Provider: 03/10/21 15:14


Source: patient, RN notes reviewed


Mode of arrival: ambulatory


Limitations: no limitations





- History of Present Illness


Initial Comments: 





Patient is a 51-year-old male that presents to the emergency department 

complaining of increased shortness of breath over the last 24 hours.  He notes 

that he does have several family members that tested positive for Covid, and he 

would like to get tested as well.  She did note that he has a dry cough that is 

nonproductive of any purulent or mucus discharge.  He states that the shortness 

of breath is more on exertion than anything.  He denied any chest pain headache 

nausea vomiting diarrhea constipation fever fatigue chills.





- Related Data


                                  Previous Rx's











 Medication  Instructions  Recorded


 


Docusate [Colace] 100 mg PO BID #20 capsule 06/30/20


 


HYDROcodone/APAP 5-325MG [Norco 1 tab PO Q6HR PRN #10 tab 06/30/20





5-325]  











                                    Allergies











Allergy/AdvReac Type Severity Reaction Status Date / Time


 


Penicillins Allergy  Swelling Verified 03/10/21 14:37














Review of Systems


ROS Statement: 


Those systems with pertinent positive or pertinent negative responses have been 

documented in the HPI.





ROS Other: All systems not noted in ROS Statement are negative.





Past Medical History


Past Medical History: No Reported History


Additional Past Medical History / Comment(s): diverticulitis with abscess


History of Any Multi-Drug Resistant Organisms: None Reported


Past Surgical History: Cholecystectomy


Additional Past Surgical History / Comment(s): vasectomy


Past Anesthesia/Blood Transfusion Reactions: Previous Problems w/ Anesthesia


Additional Past Anesthesia/Blood Transfusion Reaction / Comment(s): difficult 

intubation with cholecystectomy intubated through nose


Past Psychological History: No Psychological Hx Reported


Smoking Status: Former smoker


Past Alcohol Use History: Occasional


Past Drug Use History: None Reported





- Past Family History


  ** Father


Family Medical History: Hypertension


Additional Family Medical History / Comment(s): heart issues with blood vessels.





  ** Mother


Family Medical History: No Reported History


Additional Family Medical History / Comment(s): colitis





General Exam


Limitations: no limitations


General appearance: alert, in no apparent distress


Head exam: Present: atraumatic, normocephalic, normal inspection


Eye exam: Present: normal appearance, PERRL, EOMI.  Absent: scleral icterus, 

conjunctival injection, periorbital swelling


ENT exam: Present: normal exam, mucous membranes moist


Neck exam: Present: normal inspection.  Absent: tenderness, meningismus, 

lymphadenopathy


Respiratory exam: Present: rhonchi (Mild in the right middle lobe.).  Absent: 

respiratory distress, wheezes, rales, stridor


Cardiovascular Exam: Present: regular rate, normal rhythm, normal heart sounds. 

Absent: systolic murmur, diastolic murmur, rubs, gallop, clicks


GI/Abdominal exam: Present: soft, normal bowel sounds.  Absent: distended, 

tenderness, guarding, rebound, rigid


Extremities exam: Present: normal inspection, full ROM, normal capillary refill.

 Absent: tenderness, pedal edema, joint swelling, calf tenderness


Neurological exam: Present: alert, oriented X3, CN II-XII intact


Psychiatric exam: Present: normal affect, normal mood





Course


                                   Vital Signs











  03/10/21





  14:34


 


Temperature 99 F


 


Pulse Rate 90


 


Respiratory 18





Rate 


 


Blood Pressure 126/81


 


O2 Sat by Pulse 96





Oximetry 














Medical Decision Making





- Medical Decision Making





51-year-old male complaining of shortness of breath, several family members 

tested positive for covert.


Chest x-ray, basic labs, Covid test ordered.


Chest x-ray no acute card up on her process.


Covid test positive.


Case discussed with Dr. Zhao, decided patient discharged home in quarantine 

with rest the family.





- Lab Data


Result diagrams: 


                                 03/10/21 15:58





                                 03/10/21 15:58


                                   Lab Results











  03/10/21 03/10/21 03/10/21 Range/Units





  15:46 15:58 15:58 


 


WBC   4.8   (3.8-10.6)  k/uL


 


RBC   5.58   (4.30-5.90)  m/uL


 


Hgb   16.6   (13.0-17.5)  gm/dL


 


Hct   48.5   (39.0-53.0)  %


 


MCV   87.0   (80.0-100.0)  fL


 


MCH   29.7   (25.0-35.0)  pg


 


MCHC   34.1   (31.0-37.0)  g/dL


 


RDW   13.2   (11.5-15.5)  %


 


Plt Count   196   (150-450)  k/uL


 


MPV   7.0   


 


Neutrophils %   65   %


 


Lymphocytes %   22   %


 


Monocytes %   9   %


 


Eosinophils %   1   %


 


Basophils %   1   %


 


Neutrophils #   3.2   (1.3-7.7)  k/uL


 


Lymphocytes #   1.1   (1.0-4.8)  k/uL


 


Monocytes #   0.4   (0-1.0)  k/uL


 


Eosinophils #   0.1   (0-0.7)  k/uL


 


Basophils #   0.1   (0-0.2)  k/uL


 


Sodium     (137-145)  mmol/L


 


Potassium     (3.5-5.1)  mmol/L


 


Chloride     ()  mmol/L


 


Carbon Dioxide     (22-30)  mmol/L


 


Anion Gap     mmol/L


 


BUN     (9-20)  mg/dL


 


Creatinine     (0.66-1.25)  mg/dL


 


Est GFR (CKD-EPI)AfAm     (>60 ml/min/1.73 sqM)  


 


Est GFR (CKD-EPI)NonAf     (>60 ml/min/1.73 sqM)  


 


Glucose     (74-99)  mg/dL


 


Calcium     (8.4-10.2)  mg/dL


 


Total Bilirubin     (0.2-1.3)  mg/dL


 


AST     (17-59)  U/L


 


ALT     (4-49)  U/L


 


Alkaline Phosphatase     ()  U/L


 


Total Protein     (6.3-8.2)  g/dL


 


Albumin     (3.5-5.0)  g/dL


 


Urine Color    Yellow  


 


Urine Appearance    Clear  (Clear)  


 


Urine pH    5.5  (5.0-8.0)  


 


Ur Specific Gravity    1.034  (1.001-1.035)  


 


Urine Protein    1+ H  (Negative)  


 


Urine Glucose (UA)    Negative  (Negative)  


 


Urine Ketones    Negative  (Negative)  


 


Urine Blood    Negative  (Negative)  


 


Urine Nitrite    Negative  (Negative)  


 


Urine Bilirubin    Negative  (Negative)  


 


Urine Urobilinogen    <2.0  (<2.0)  mg/dL


 


Ur Leukocyte Esterase    Negative  (Negative)  


 


Urine RBC    1  (0-5)  /hpf


 


Urine WBC    2  (0-5)  /hpf


 


Urine Mucus    Many H  (None)  /hpf


 


Coronavirus (PCR)  Detected A    (Not Detectd)  














  03/10/21 Range/Units





  15:58 


 


WBC   (3.8-10.6)  k/uL


 


RBC   (4.30-5.90)  m/uL


 


Hgb   (13.0-17.5)  gm/dL


 


Hct   (39.0-53.0)  %


 


MCV   (80.0-100.0)  fL


 


MCH   (25.0-35.0)  pg


 


MCHC   (31.0-37.0)  g/dL


 


RDW   (11.5-15.5)  %


 


Plt Count   (150-450)  k/uL


 


MPV   


 


Neutrophils %   %


 


Lymphocytes %   %


 


Monocytes %   %


 


Eosinophils %   %


 


Basophils %   %


 


Neutrophils #   (1.3-7.7)  k/uL


 


Lymphocytes #   (1.0-4.8)  k/uL


 


Monocytes #   (0-1.0)  k/uL


 


Eosinophils #   (0-0.7)  k/uL


 


Basophils #   (0-0.2)  k/uL


 


Sodium  139  (137-145)  mmol/L


 


Potassium  3.8  (3.5-5.1)  mmol/L


 


Chloride  101  ()  mmol/L


 


Carbon Dioxide  29  (22-30)  mmol/L


 


Anion Gap  9  mmol/L


 


BUN  14  (9-20)  mg/dL


 


Creatinine  1.21  (0.66-1.25)  mg/dL


 


Est GFR (CKD-EPI)AfAm  79  (>60 ml/min/1.73 sqM)  


 


Est GFR (CKD-EPI)NonAf  68  (>60 ml/min/1.73 sqM)  


 


Glucose  109 H  (74-99)  mg/dL


 


Calcium  9.7  (8.4-10.2)  mg/dL


 


Total Bilirubin  0.7  (0.2-1.3)  mg/dL


 


AST  25  (17-59)  U/L


 


ALT  27  (4-49)  U/L


 


Alkaline Phosphatase  53  ()  U/L


 


Total Protein  7.4  (6.3-8.2)  g/dL


 


Albumin  4.4  (3.5-5.0)  g/dL


 


Urine Color   


 


Urine Appearance   (Clear)  


 


Urine pH   (5.0-8.0)  


 


Ur Specific Gravity   (1.001-1.035)  


 


Urine Protein   (Negative)  


 


Urine Glucose (UA)   (Negative)  


 


Urine Ketones   (Negative)  


 


Urine Blood   (Negative)  


 


Urine Nitrite   (Negative)  


 


Urine Bilirubin   (Negative)  


 


Urine Urobilinogen   (<2.0)  mg/dL


 


Ur Leukocyte Esterase   (Negative)  


 


Urine RBC   (0-5)  /hpf


 


Urine WBC   (0-5)  /hpf


 


Urine Mucus   (None)  /hpf


 


Coronavirus (PCR)   (Not Detectd)  














- Radiology Data


Radiology results: report reviewed, image reviewed


Chest x-ray: No acute cardiopulmonary process.





Disposition


Clinical Impression: 


 COVID-19, Shortness of breath





Disposition: HOME SELF-CARE


Condition: Stable


Instructions (If sedation given, give patient instructions):  Coronavirus 

Disease 2019 (COVID-19)


Additional Instructions: 


Please return to the Emergency Department if symptoms worsen or any other 

concerns.


Follow-up with primary care in 5-7 days.


Conservative management with rest, increase oral fluid intake, anti-

inflammatories as needed for conservative management.


If shortness of breath, dyspnea increases or get records please return to the 

ER.


Is patient prescribed a controlled substance at d/c from ED?: No


Referrals: 


Shyanne John MD [Primary Care Provider] - 1-2 days


Time of Disposition: 16:31

## 2021-03-14 ENCOUNTER — HOSPITAL ENCOUNTER (EMERGENCY)
Dept: HOSPITAL 47 - EC | Age: 51
Discharge: HOME | End: 2021-03-14
Payer: COMMERCIAL

## 2021-03-14 VITALS — TEMPERATURE: 100.6 F

## 2021-03-14 VITALS — RESPIRATION RATE: 18 BRPM

## 2021-03-14 VITALS — HEART RATE: 85 BPM | SYSTOLIC BLOOD PRESSURE: 142 MMHG | DIASTOLIC BLOOD PRESSURE: 86 MMHG

## 2021-03-14 DIAGNOSIS — Z90.49: ICD-10-CM

## 2021-03-14 DIAGNOSIS — U07.1: Primary | ICD-10-CM

## 2021-03-14 DIAGNOSIS — Z87.891: ICD-10-CM

## 2021-03-14 DIAGNOSIS — Z88.0: ICD-10-CM

## 2021-03-14 LAB
ALBUMIN SERPL-MCNC: 3.9 G/DL (ref 3.5–5)
ALP SERPL-CCNC: 46 U/L (ref 38–126)
ALT SERPL-CCNC: 31 U/L (ref 4–49)
ANION GAP SERPL CALC-SCNC: 9 MMOL/L
AST SERPL-CCNC: 35 U/L (ref 17–59)
BASOPHILS # BLD AUTO: 0 K/UL (ref 0–0.2)
BASOPHILS NFR BLD AUTO: 1 %
BUN SERPL-SCNC: 15 MG/DL (ref 9–20)
CALCIUM SPEC-MCNC: 8.7 MG/DL (ref 8.4–10.2)
CHLORIDE SERPL-SCNC: 101 MMOL/L (ref 98–107)
CO2 SERPL-SCNC: 25 MMOL/L (ref 22–30)
EOSINOPHIL # BLD AUTO: 0.1 K/UL (ref 0–0.7)
EOSINOPHIL NFR BLD AUTO: 1 %
ERYTHROCYTE [DISTWIDTH] IN BLOOD BY AUTOMATED COUNT: 5.36 M/UL (ref 4.3–5.9)
ERYTHROCYTE [DISTWIDTH] IN BLOOD: 13.1 % (ref 11.5–15.5)
GLUCOSE SERPL-MCNC: 129 MG/DL (ref 74–99)
HCT VFR BLD AUTO: 46.2 % (ref 39–53)
HGB BLD-MCNC: 15.8 GM/DL (ref 13–17.5)
LYMPHOCYTES # SPEC AUTO: 0.8 K/UL (ref 1–4.8)
LYMPHOCYTES NFR SPEC AUTO: 16 %
MAGNESIUM SPEC-SCNC: 2.1 MG/DL (ref 1.6–2.3)
MCH RBC QN AUTO: 29.5 PG (ref 25–35)
MCHC RBC AUTO-ENTMCNC: 34.3 G/DL (ref 31–37)
MCV RBC AUTO: 86.2 FL (ref 80–100)
MONOCYTES # BLD AUTO: 0.2 K/UL (ref 0–1)
MONOCYTES NFR BLD AUTO: 5 %
NEUTROPHILS # BLD AUTO: 3.6 K/UL (ref 1.3–7.7)
NEUTROPHILS NFR BLD AUTO: 76 %
PLATELET # BLD AUTO: 141 K/UL (ref 150–450)
POTASSIUM SERPL-SCNC: 3.8 MMOL/L (ref 3.5–5.1)
PROT SERPL-MCNC: 6.7 G/DL (ref 6.3–8.2)
SODIUM SERPL-SCNC: 135 MMOL/L (ref 137–145)
WBC # BLD AUTO: 4.7 K/UL (ref 3.8–10.6)

## 2021-03-14 PROCEDURE — 36415 COLL VENOUS BLD VENIPUNCTURE: CPT

## 2021-03-14 PROCEDURE — 85025 COMPLETE CBC W/AUTO DIFF WBC: CPT

## 2021-03-14 PROCEDURE — 99285 EMERGENCY DEPT VISIT HI MDM: CPT

## 2021-03-14 PROCEDURE — 83735 ASSAY OF MAGNESIUM: CPT

## 2021-03-14 PROCEDURE — 71045 X-RAY EXAM CHEST 1 VIEW: CPT

## 2021-03-14 PROCEDURE — 80053 COMPREHEN METABOLIC PANEL: CPT

## 2021-03-14 PROCEDURE — 96361 HYDRATE IV INFUSION ADD-ON: CPT

## 2021-03-14 PROCEDURE — 96360 HYDRATION IV INFUSION INIT: CPT

## 2021-03-14 NOTE — ED
SOB HPI





- General


Chief Complaint: Shortness of Breath


Stated Complaint: COVID+, worsening symptoms


Source: patient


Mode of arrival: ambulatory


Limitations: no limitations





- History of Present Illness


Initial Comments: 


Sukhi is a previously healthy 51-year-old male who was diagnosed with COVID 5 

days ago.  Patient reports that since that time he's had worsening generalized 

malaise, fatigue, body aches.  He has shortness of breath with exertion but no 

chest pain or palpitations.  No lightheadedness or passing out.  He's been 

taking 500 mg of Tylenol every 4-6 hours for fever with minimal improvement.  

Patient reports today he just feels like he is getting worse and dehydrated and 

wanted to be reevaluated.








- Related Data


                                  Previous Rx's











 Medication  Instructions  Recorded


 


Docusate [Colace] 100 mg PO BID #20 capsule 06/30/20


 


HYDROcodone/APAP 5-325MG [Norco 1 tab PO Q6HR PRN #10 tab 06/30/20





5-325]  











                                    Allergies











Allergy/AdvReac Type Severity Reaction Status Date / Time


 


Penicillins Allergy  Swelling Verified 03/14/21 16:46














Review of Systems


ROS Statement: 


Those systems with pertinent positive or pertinent negative responses have been 

documented in the HPI.





ROS Other: All systems not noted in ROS Statement are negative.





Past Medical History


Past Medical History: No Reported History


Additional Past Medical History / Comment(s): diverticulitis with abscess


History of Any Multi-Drug Resistant Organisms: None Reported


Past Surgical History: Cholecystectomy


Additional Past Surgical History / Comment(s): vasectomy


Past Anesthesia/Blood Transfusion Reactions: Previous Problems w/ Anesthesia


Additional Past Anesthesia/Blood Transfusion Reaction / Comment(s): difficult 

intubation with cholecystectomy intubated through nose


Past Psychological History: No Psychological Hx Reported


Smoking Status: Former smoker


Past Alcohol Use History: Occasional


Past Drug Use History: None Reported





- Past Family History


  ** Father


Family Medical History: Hypertension


Additional Family Medical History / Comment(s): heart issues with blood vessels.





  ** Mother


Family Medical History: No Reported History


Additional Family Medical History / Comment(s): colitis





General Exam





- General Exam Comments


Initial Comments: 


Physical Exam


GENERAL:


Ill-appearing gentleman





HENT:


Normocephalic, Atraumatic. 





EYES:


PERRL, EOMI





PULMONARY:


Unlabored respirations.  


No audible rales rhonchi or wheezing was noted.





CARDIOVASCULAR:


There is a regular rate and rhythm without any murmurs gallops or rubs.  





ABDOMEN:


Soft and nontender with normal bowel sounds. 





SKIN:


Skin is clear with no lesions or rashes and otherwise unremarkable.





: 


Deferred





NEUROLOGIC:


Patient is alert and oriented x3.  Moving all extremities spontaneously





MUSCULOSKELETAL:


Normal extremities with adequate strength and full range of motion.  No lower 

extremity swelling or edema.  No calf tenderness.  





PSYCHIATRIC:


Normal psychiatric evaluation.





Limitations: no limitations





Course


                                   Vital Signs











  03/14/21 03/14/21 03/14/21





  16:43 17:22 17:40


 


Temperature 102.2 F H 102.2 F H 


 


Pulse Rate 96 85 


 


Respiratory 20 18 18





Rate   


 


Blood Pressure 129/86 123/85 


 


O2 Sat by Pulse 95 94 L 





Oximetry   














  03/14/21 03/14/21 03/14/21





  18:13 19:31 20:01


 


Temperature 101.2 F H 100.6 F H 100.6 F H


 


Pulse Rate 80 74 85


 


Respiratory 18 18 18





Rate   


 


Blood Pressure  130/76 142/86


 


O2 Sat by Pulse 99 96 94 L





Oximetry   














Medical Decision Making





- Medical Decision Making


The patient was seen and evaluated history is obtained from the patient and 

review of medical record


51-year-old male with no comorbidities not a candidate for monoclonal antibodies


Repeat labs were obtained patient received 2 L IV fluid bolus and Motrin for his

fever


Chest x-ray shows bilateral patchy infiltrate consistent with COVID pneumonia





Patient will be treated with Decadron, other supportive care measures and return

parameters were discussed patient discharged home in stable condition.








- Lab Data


Result diagrams: 


                                 03/14/21 17:24





                                 03/14/21 17:24


                                   Lab Results











  03/14/21 03/14/21 Range/Units





  17:24 17:24 


 


WBC  4.7   (3.8-10.6)  k/uL


 


RBC  5.36   (4.30-5.90)  m/uL


 


Hgb  15.8   (13.0-17.5)  gm/dL


 


Hct  46.2   (39.0-53.0)  %


 


MCV  86.2   (80.0-100.0)  fL


 


MCH  29.5   (25.0-35.0)  pg


 


MCHC  34.3   (31.0-37.0)  g/dL


 


RDW  13.1   (11.5-15.5)  %


 


Plt Count  141 L   (150-450)  k/uL


 


MPV  7.8   


 


Neutrophils %  76   %


 


Lymphocytes %  16   %


 


Monocytes %  5   %


 


Eosinophils %  1   %


 


Basophils %  1   %


 


Neutrophils #  3.6   (1.3-7.7)  k/uL


 


Lymphocytes #  0.8 L   (1.0-4.8)  k/uL


 


Monocytes #  0.2   (0-1.0)  k/uL


 


Eosinophils #  0.1   (0-0.7)  k/uL


 


Basophils #  0.0   (0-0.2)  k/uL


 


Sodium   135 L  (137-145)  mmol/L


 


Potassium   3.8  (3.5-5.1)  mmol/L


 


Chloride   101  ()  mmol/L


 


Carbon Dioxide   25  (22-30)  mmol/L


 


Anion Gap   9  mmol/L


 


BUN   15  (9-20)  mg/dL


 


Creatinine   0.94  (0.66-1.25)  mg/dL


 


Est GFR (CKD-EPI)AfAm   >90  (>60 ml/min/1.73 sqM)  


 


Est GFR (CKD-EPI)NonAf   >90  (>60 ml/min/1.73 sqM)  


 


Glucose   129 H  (74-99)  mg/dL


 


Calcium   8.7  (8.4-10.2)  mg/dL


 


Magnesium   2.1  (1.6-2.3)  mg/dL


 


Total Bilirubin   0.6  (0.2-1.3)  mg/dL


 


AST   35  (17-59)  U/L


 


ALT   31  (4-49)  U/L


 


Alkaline Phosphatase   46  ()  U/L


 


Total Protein   6.7  (6.3-8.2)  g/dL


 


Albumin   3.9  (3.5-5.0)  g/dL














Disposition


Clinical Impression: 


 COVID-19





Disposition: HOME SELF-CARE


Condition: Stable


Is patient prescribed a controlled substance at d/c from ED?: No


Referrals: 


Shyanne John MD [Primary Care Provider] - 1-2 days

## 2021-03-14 NOTE — XR
EXAMINATION TYPE: XR chest 1V portable

 

DATE OF EXAM: 3/14/2021

 

COMPARISON: 3/10/2021

 

HISTORY: Pneumonia. Cough.

 

TECHNIQUE: Single view

 

FINDINGS: There is some coarse interstitial infiltrate in the mid and lower lung fields. There is pro
bably some COPD. Heart size is normal. There is some coalescent infiltrate in the medial right lower 
lobe. There are no hilar masses. Mediastinum is normal. Bony thorax appears intact.

 

IMPRESSION: Bilateral predominantly interstitial pneumonia is a change compared to recent exam.

## 2021-03-20 ENCOUNTER — HOSPITAL ENCOUNTER (INPATIENT)
Dept: HOSPITAL 47 - EC | Age: 51
LOS: 3 days | Discharge: HOME | DRG: 177 | End: 2021-03-23
Attending: INTERNAL MEDICINE | Admitting: INTERNAL MEDICINE
Payer: COMMERCIAL

## 2021-03-20 DIAGNOSIS — E87.2: ICD-10-CM

## 2021-03-20 DIAGNOSIS — U07.1: Primary | ICD-10-CM

## 2021-03-20 DIAGNOSIS — J96.01: ICD-10-CM

## 2021-03-20 DIAGNOSIS — E87.6: ICD-10-CM

## 2021-03-20 DIAGNOSIS — E86.0: ICD-10-CM

## 2021-03-20 DIAGNOSIS — E87.1: ICD-10-CM

## 2021-03-20 DIAGNOSIS — Z82.49: ICD-10-CM

## 2021-03-20 DIAGNOSIS — J12.82: ICD-10-CM

## 2021-03-20 DIAGNOSIS — Z90.49: ICD-10-CM

## 2021-03-20 DIAGNOSIS — Z87.891: ICD-10-CM

## 2021-03-20 DIAGNOSIS — E86.1: ICD-10-CM

## 2021-03-20 DIAGNOSIS — Z98.52: ICD-10-CM

## 2021-03-20 DIAGNOSIS — Z88.0: ICD-10-CM

## 2021-03-20 DIAGNOSIS — K57.90: ICD-10-CM

## 2021-03-20 LAB
ALBUMIN SERPL-MCNC: 3.3 G/DL (ref 3.5–5)
ALP SERPL-CCNC: 91 U/L (ref 38–126)
ALT SERPL-CCNC: 49 U/L (ref 4–49)
ANION GAP SERPL CALC-SCNC: 9 MMOL/L
APTT BLD: 22.9 SEC (ref 22–30)
AST SERPL-CCNC: 37 U/L (ref 17–59)
BASOPHILS # BLD AUTO: 0 K/UL (ref 0–0.2)
BASOPHILS NFR BLD AUTO: 0 %
BUN SERPL-SCNC: 15 MG/DL (ref 9–20)
CALCIUM SPEC-MCNC: 8.4 MG/DL (ref 8.4–10.2)
CHLORIDE SERPL-SCNC: 98 MMOL/L (ref 98–107)
CO2 SERPL-SCNC: 26 MMOL/L (ref 22–30)
D DIMER PPP FEU-MCNC: 0.52 MG/L FEU (ref ?–0.6)
EOSINOPHIL # BLD AUTO: 0 K/UL (ref 0–0.7)
EOSINOPHIL NFR BLD AUTO: 0 %
ERYTHROCYTE [DISTWIDTH] IN BLOOD BY AUTOMATED COUNT: 4.76 M/UL (ref 4.3–5.9)
ERYTHROCYTE [DISTWIDTH] IN BLOOD: 12.8 % (ref 11.5–15.5)
FERRITIN SERPL-MCNC: 1255.7 NG/ML (ref 22–322)
GLUCOSE SERPL-MCNC: 141 MG/DL (ref 74–99)
HCT VFR BLD AUTO: 40.7 % (ref 39–53)
HGB BLD-MCNC: 14.1 GM/DL (ref 13–17.5)
INR PPP: 1 (ref ?–1.2)
LDH SPEC-CCNC: 771 U/L (ref 313–618)
LYMPHOCYTES # SPEC AUTO: 0.5 K/UL (ref 1–4.8)
LYMPHOCYTES NFR SPEC AUTO: 9 %
MAGNESIUM SPEC-SCNC: 2.3 MG/DL (ref 1.6–2.3)
MCH RBC QN AUTO: 29.7 PG (ref 25–35)
MCHC RBC AUTO-ENTMCNC: 34.8 G/DL (ref 31–37)
MCV RBC AUTO: 85.4 FL (ref 80–100)
MONOCYTES # BLD AUTO: 0.3 K/UL (ref 0–1)
MONOCYTES NFR BLD AUTO: 6 %
NEUTROPHILS # BLD AUTO: 4.9 K/UL (ref 1.3–7.7)
NEUTROPHILS NFR BLD AUTO: 83 %
PLATELET # BLD AUTO: 331 K/UL (ref 150–450)
POTASSIUM SERPL-SCNC: 3.4 MMOL/L (ref 3.5–5.1)
PROT SERPL-MCNC: 6.2 G/DL (ref 6.3–8.2)
PT BLD: 10.7 SEC (ref 9–12)
SODIUM SERPL-SCNC: 133 MMOL/L (ref 137–145)
WBC # BLD AUTO: 5.9 K/UL (ref 3.8–10.6)

## 2021-03-20 PROCEDURE — 84145 PROCALCITONIN (PCT): CPT

## 2021-03-20 PROCEDURE — 83615 LACTATE (LD) (LDH) ENZYME: CPT

## 2021-03-20 PROCEDURE — 96374 THER/PROPH/DIAG INJ IV PUSH: CPT

## 2021-03-20 PROCEDURE — 86140 C-REACTIVE PROTEIN: CPT

## 2021-03-20 PROCEDURE — 85730 THROMBOPLASTIN TIME PARTIAL: CPT

## 2021-03-20 PROCEDURE — 82728 ASSAY OF FERRITIN: CPT

## 2021-03-20 PROCEDURE — 85379 FIBRIN DEGRADATION QUANT: CPT

## 2021-03-20 PROCEDURE — 96361 HYDRATE IV INFUSION ADD-ON: CPT

## 2021-03-20 PROCEDURE — 83605 ASSAY OF LACTIC ACID: CPT

## 2021-03-20 PROCEDURE — 85025 COMPLETE CBC W/AUTO DIFF WBC: CPT

## 2021-03-20 PROCEDURE — 36415 COLL VENOUS BLD VENIPUNCTURE: CPT

## 2021-03-20 PROCEDURE — 99285 EMERGENCY DEPT VISIT HI MDM: CPT

## 2021-03-20 PROCEDURE — 83735 ASSAY OF MAGNESIUM: CPT

## 2021-03-20 PROCEDURE — 80053 COMPREHEN METABOLIC PANEL: CPT

## 2021-03-20 PROCEDURE — 93005 ELECTROCARDIOGRAM TRACING: CPT

## 2021-03-20 PROCEDURE — 71045 X-RAY EXAM CHEST 1 VIEW: CPT

## 2021-03-20 PROCEDURE — 85610 PROTHROMBIN TIME: CPT

## 2021-03-20 PROCEDURE — 80048 BASIC METABOLIC PNL TOTAL CA: CPT

## 2021-03-20 RX ADMIN — OXYCODONE HYDROCHLORIDE AND ACETAMINOPHEN SCH MG: 500 TABLET ORAL at 20:28

## 2021-03-20 RX ADMIN — FAMOTIDINE SCH MG: 20 TABLET, FILM COATED ORAL at 20:28

## 2021-03-20 RX ADMIN — CEFAZOLIN SCH MLS/HR: 330 INJECTION, POWDER, FOR SOLUTION INTRAMUSCULAR; INTRAVENOUS at 17:52

## 2021-03-20 NOTE — XR
EXAMINATION TYPE: XR chest 1V portable

 

DATE OF EXAM: 3/20/2021

 

COMPARISON: 3/14/2021

 

INDICATION: Cough short of breath

 

TECHNIQUE: Single frontal view of the chest is obtained.

 

FINDINGS:  

The heart size is normal.  

The pulmonary vasculature is normal.  

Patchy nonspecific infiltrates are present bilaterally which can be compatible with atypical pneumoni
a in the proper clinical setting. Findings worsened over the interval.  

 

 

IMPRESSION:  

1. Worsening bilateral patchy subsegmental infiltrates are nonspecific but can be compatible with aty
pical pneumonia in the proper clinical setting.
Quality 111:Pneumonia Vaccination Status For Older Adults: Pneumococcal Vaccination not Administered or Previously Received, Reason not Otherwise Specified
Quality 137: Melanoma: Continuity Of Care - Recall System: Patient information entered into a recall system that includes: target date for the next exam specified AND a process to follow up with patients regarding missed or unscheduled appointments
Detail Level: Detailed
Quality 110: Preventive Care And Screening: Influenza Immunization: Influenza Immunization Administered during Influenza season
Quality 224: Stage 0-Iic Melanoma: Overutilization Of Imaging Studies For Only Stage 0-Iic Melanoma: None of the following diagnostic imaging studies ordered: chest X-ray, CT, Ultrasound, MRI, PET, or nuclear medicine scans (ML)

## 2021-03-20 NOTE — ED
General Adult HPI





- General


Chief complaint: Shortness of Breath


Stated complaint: +COVID,SOB


Time Seen by Provider: 03/20/21 13:16


Source: patient, EMS, RN notes reviewed, old records reviewed


Mode of arrival: EMS


Limitations: no limitations





- History of Present Illness


Initial comments: 





51-year-old male presenting for evaluation of increased cough and dyspnea.  

Patient was diagnosed with coronavirus approximately 11 days ago.  He's had 

worsening dyspnea.  He's had increased generalized weakness and fatigue.  He has

had persistent fevers.  Patient states he is otherwise healthy, no chronic 

medical conditions.  He's had no significant vomiting or diarrhea.





- Related Data


                                  Previous Rx's











 Medication  Instructions  Recorded


 


Docusate [Colace] 100 mg PO BID #20 capsule 06/30/20


 


HYDROcodone/APAP 5-325MG [Norco 1 tab PO Q6HR PRN #10 tab 06/30/20





5-325]  











                                    Allergies











Allergy/AdvReac Type Severity Reaction Status Date / Time


 


Penicillins Allergy  Swelling Verified 03/14/21 16:46














Review of Systems


ROS Statement: 


Those systems with pertinent positive or pertinent negative responses have been 

documented in the HPI.





ROS Other: All systems not noted in ROS Statement are negative.





Past Medical History


Past Medical History: No Reported History


Additional Past Medical History / Comment(s): diverticulitis with abscess


History of Any Multi-Drug Resistant Organisms: None Reported


Past Surgical History: Cholecystectomy


Additional Past Surgical History / Comment(s): vasectomy


Past Anesthesia/Blood Transfusion Reactions: Previous Problems w/ Anesthesia


Additional Past Anesthesia/Blood Transfusion Reaction / Comment(s): difficult 

intubation with cholecystectomy intubated through nose


Past Psychological History: No Psychological Hx Reported


Smoking Status: Former smoker


Past Alcohol Use History: Occasional


Past Drug Use History: None Reported





- Past Family History


  ** Father


Family Medical History: Hypertension


Additional Family Medical History / Comment(s): heart issues with blood vessels.





  ** Mother


Family Medical History: No Reported History


Additional Family Medical History / Comment(s): colitis





General Exam


Limitations: no limitations


General appearance: alert, in distress


Head exam: Present: atraumatic, normocephalic


Eye exam: Present: normal appearance, PERRL


ENT exam: Present: mucous membranes dry


Neck exam: Present: normal inspection.  Absent: tenderness, meningismus


Respiratory exam: Present: respiratory distress, rales, rhonchi, accessory 

muscle use, decreased breath sounds


Cardiovascular Exam: Present: regular rate, normal rhythm


GI/Abdominal exam: Present: soft, distended


Extremities exam: Present: normal inspection, normal capillary refill.  Absent: 

pedal edema, calf tenderness


Neurological exam: Present: alert, oriented X3, CN II-XII intact.  Absent: motor

sensory deficit


Psychiatric exam: Present: normal affect, normal mood


Skin exam: Present: warm, dry, intact.  Absent: cyanosis, diaphoretic





Course


                                   Vital Signs











  03/20/21 03/20/21





  13:03 14:52


 


Temperature 102.1 F H 99.5 F


 


Pulse Rate 94 91


 


Respiratory 20 18





Rate  


 


Blood Pressure 162/95 150/95


 


O2 Sat by Pulse 93 L 94 L





Oximetry  














EKG Findings





- EKG Comments:


EKG Findings:: Normal sinus rhythm, left atrial enlargement, rate of 91, ND 

interval 186, QRS duration 92, , no ST segment elevation.





Medical Decision Making





- Medical Decision Making





Patient with increased cough and dyspnea, history of coronavirus, x-ray showing 

bilateral pneumonia.  Patient given Decadron, laboratory studies will be 

obtained, results pending.  Patient has been admitted with pulmonology on 

consult.  Case discussed with Dr. Barahona.





- Lab Data


Result diagrams: 


                                 03/20/21 13:16





                                 03/20/21 13:16


                                   Lab Results











  03/20/21 03/20/21 03/20/21 Range/Units





  13:16 13:16 13:16 


 


WBC  5.9    (3.8-10.6)  k/uL


 


RBC  4.76    (4.30-5.90)  m/uL


 


Hgb  14.1    (13.0-17.5)  gm/dL


 


Hct  40.7    (39.0-53.0)  %


 


MCV  85.4    (80.0-100.0)  fL


 


MCH  29.7    (25.0-35.0)  pg


 


MCHC  34.8    (31.0-37.0)  g/dL


 


RDW  12.8    (11.5-15.5)  %


 


Plt Count  331  D    (150-450)  k/uL


 


MPV  7.3    


 


Neutrophils %  83    %


 


Lymphocytes %  9    %


 


Monocytes %  6    %


 


Eosinophils %  0    %


 


Basophils %  0    %


 


Neutrophils #  4.9    (1.3-7.7)  k/uL


 


Lymphocytes #  0.5 L    (1.0-4.8)  k/uL


 


Monocytes #  0.3    (0-1.0)  k/uL


 


Eosinophils #  0.0    (0-0.7)  k/uL


 


Basophils #  0.0    (0-0.2)  k/uL


 


PT   10.7   (9.0-12.0)  sec


 


INR   1.0   (<1.2)  


 


APTT   22.9   (22.0-30.0)  sec


 


D-Dimer   0.52   (<0.60)  mg/L FEU


 


Sodium    133 L  (137-145)  mmol/L


 


Potassium    3.4 L  (3.5-5.1)  mmol/L


 


Chloride    98  ()  mmol/L


 


Carbon Dioxide    26  (22-30)  mmol/L


 


Anion Gap    9  mmol/L


 


BUN    15  (9-20)  mg/dL


 


Creatinine    0.85  (0.66-1.25)  mg/dL


 


Est GFR (CKD-EPI)AfAm    >90  (>60 ml/min/1.73 sqM)  


 


Est GFR (CKD-EPI)NonAf    >90  (>60 ml/min/1.73 sqM)  


 


Glucose    141 H  (74-99)  mg/dL


 


Plasma Lactic Acid Kaden     (0.7-2.0)  mmol/L


 


Calcium    8.4  (8.4-10.2)  mg/dL


 


Magnesium    2.3  (1.6-2.3)  mg/dL


 


Total Bilirubin    1.0  (0.2-1.3)  mg/dL


 


AST    37  (17-59)  U/L


 


ALT    49  (4-49)  U/L


 


Alkaline Phosphatase    91  ()  U/L


 


Lactate Dehydrogenase    771 H  (313-618)  U/L


 


C-Reactive Protein    52.5 H  (<10.0)  mg/L


 


Total Protein    6.2 L  (6.3-8.2)  g/dL


 


Albumin    3.3 L  (3.5-5.0)  g/dL














  03/20/21 Range/Units





  13:16 


 


WBC   (3.8-10.6)  k/uL


 


RBC   (4.30-5.90)  m/uL


 


Hgb   (13.0-17.5)  gm/dL


 


Hct   (39.0-53.0)  %


 


MCV   (80.0-100.0)  fL


 


MCH   (25.0-35.0)  pg


 


MCHC   (31.0-37.0)  g/dL


 


RDW   (11.5-15.5)  %


 


Plt Count   (150-450)  k/uL


 


MPV   


 


Neutrophils %   %


 


Lymphocytes %   %


 


Monocytes %   %


 


Eosinophils %   %


 


Basophils %   %


 


Neutrophils #   (1.3-7.7)  k/uL


 


Lymphocytes #   (1.0-4.8)  k/uL


 


Monocytes #   (0-1.0)  k/uL


 


Eosinophils #   (0-0.7)  k/uL


 


Basophils #   (0-0.2)  k/uL


 


PT   (9.0-12.0)  sec


 


INR   (<1.2)  


 


APTT   (22.0-30.0)  sec


 


D-Dimer   (<0.60)  mg/L FEU


 


Sodium   (137-145)  mmol/L


 


Potassium   (3.5-5.1)  mmol/L


 


Chloride   ()  mmol/L


 


Carbon Dioxide   (22-30)  mmol/L


 


Anion Gap   mmol/L


 


BUN   (9-20)  mg/dL


 


Creatinine   (0.66-1.25)  mg/dL


 


Est GFR (CKD-EPI)AfAm   (>60 ml/min/1.73 sqM)  


 


Est GFR (CKD-EPI)NonAf   (>60 ml/min/1.73 sqM)  


 


Glucose   (74-99)  mg/dL


 


Plasma Lactic Acid Kaden  2.3 H*  (0.7-2.0)  mmol/L


 


Calcium   (8.4-10.2)  mg/dL


 


Magnesium   (1.6-2.3)  mg/dL


 


Total Bilirubin   (0.2-1.3)  mg/dL


 


AST   (17-59)  U/L


 


ALT   (4-49)  U/L


 


Alkaline Phosphatase   ()  U/L


 


Lactate Dehydrogenase   (313-618)  U/L


 


C-Reactive Protein   (<10.0)  mg/L


 


Total Protein   (6.3-8.2)  g/dL


 


Albumin   (3.5-5.0)  g/dL














Disposition


Clinical Impression: 


 Pneumonia due to COVID-19 virus





Disposition: ADMITTED AS IP TO THIS HOSP


Condition: Stable


Is patient prescribed a controlled substance at d/c from ED?: No


Referrals: 


Shyanne John MD [Primary Care Provider] - 1-2 days


Decision to Admit Reason: Admit from EC


Decision Date: 03/20/21


Decision Time: 15:30

## 2021-03-20 NOTE — P.HPIM
History of Present Illness








51-year-old male presenting for evaluation of increased cough and dyspnea.  

Patient was diagnosed with coronavirus approximately 11 days ago and had cold 

symptoms for about 2 weeks.  He's had worsening dyspnea.  He's had increased 

generalized weakness and fatigue.  He has had persistent fevers.  Patient states

he is otherwise healthy, no chronic medical conditions.  He's had no significant

vomiting or diarrhea.  Patient also complaining of for dehydration 

lightheadedness and patient does have low sodium and low potassium patient will 

be started on IV fluids patient does have elevated inflammatory markers although

d-dimer is only 0.52








Review of Systems








REVIEW OF SYSTEMS: 


CONSTITUTIONAL: As mentioned in HPI


HEENT: No recent visual problems or hearing problems. Denied any sore throat. 


CARDIOVASCULAR: No chest pain, orthopnea, PND, no palpitations, no syncope. 


PULMONARY:  no hemoptysis. 


GASTROINTESTINAL: No diarrhea, no nausea, no vomiting, no abdominal pain. 


NEUROLOGICAL: No headaches, no weakness, no numbness. 


HEMATOLOGICAL: Denies any bleeding or petechiae. 


GENITOURINARY: Denies any burning micturition, frequency, or urgency. 


MUSCULOSKELETAL/RHEUMATOLOGICAL: Denies any joint pain, swelling, or any muscle 

pain. 


ENDOCRINE: Denies any polyuria or polydipsia. 





The rest of the 14-point review of systems is negative.











Past Medical History


Past Medical History: No Reported History


Additional Past Medical History / Comment(s): diverticulitis with abscess


History of Any Multi-Drug Resistant Organisms: None Reported


Past Surgical History: Cholecystectomy


Additional Past Surgical History / Comment(s): vasectomy


Past Anesthesia/Blood Transfusion Reactions: Previous Problems w/ Anesthesia


Additional Past Anesthesia/Blood Transfusion Reaction / Comment(s): difficult 

intubation with cholecystectomy intubated through nose


Past Psychological History: No Psychological Hx Reported


Smoking Status: Former smoker


Past Alcohol Use History: Occasional


Past Drug Use History: None Reported





- Past Family History


  ** Father


Family Medical History: Hypertension


Additional Family Medical History / Comment(s): heart issues with blood vessels.





  ** Mother


Family Medical History: No Reported History


Additional Family Medical History / Comment(s): colitis





Medications and Allergies


                                Home Medications











 Medication  Instructions  Recorded  Confirmed  Type


 


Docusate [Colace] 100 mg PO BID #20 capsule 06/30/20  Rx


 


HYDROcodone/APAP 5-325MG [Norco 1 tab PO Q6HR PRN #10 tab 06/30/20  Rx





5-325]    








                                    Allergies











Allergy/AdvReac Type Severity Reaction Status Date / Time


 


Penicillins Allergy  Swelling Verified 03/14/21 16:46














Physical Exam


Vitals: 


                                   Vital Signs











  Temp Pulse Resp BP Pulse Ox


 


 03/20/21 17:53   80  18  155/98  94 L


 


 03/20/21 14:52  99.5 F  91  18  150/95  94 L


 


 03/20/21 13:03  102.1 F H  94  20  162/95  93 L








                                Intake and Output











 03/20/21 03/20/21 03/20/21





 06:59 14:59 22:59


 


Other:   


 


  Weight  90.265 kg 

















PHYSICAL EXAMINATION: 





GENERAL: The patient is alert and oriented x3, not in any acute distress. Well 

developed, well nourished. 


HEENT: Pupils are round and equally reacting to light. EOMI. No scleral icterus.

No conjunctival pallor. Normocephalic, atraumatic. No pharyngeal erythema. No 

thyromegaly. 


CARDIOVASCULAR: S1 and S2 present. No murmurs, rubs, or gallops. 


PULMONARY: Chest is clear to auscultation, no wheezing or crackles. 


ABDOMEN: Soft, nontender, nondistended, normoactive bowel sounds. No palpable 

organomegaly. 


MUSCULOSKELETAL: No joint swelling or deformity.


EXTREMITIES: No cyanosis, clubbing, or pedal edema. 


NEUROLOGICAL: Gross neurological examination did not reveal any focal deficits. 


SKIN: No rashes. 

















Results


CBC & Chem 7: 


                                 03/20/21 13:16





                                 03/20/21 13:16


Labs: 


                  Abnormal Lab Results - Last 24 Hours (Table)











  03/20/21 03/20/21 03/20/21 Range/Units





  13:16 13:16 13:16 


 


Lymphocytes #  0.5 L    (1.0-4.8)  k/uL


 


Sodium   133 L   (137-145)  mmol/L


 


Potassium   3.4 L   (3.5-5.1)  mmol/L


 


Glucose   141 H   (74-99)  mg/dL


 


Plasma Lactic Acid Kaden    2.3 H*  (0.7-2.0)  mmol/L


 


Ferritin   1255.7 H   (22.0-322.0)  ng/mL


 


Lactate Dehydrogenase   771 H   (313-618)  U/L


 


C-Reactive Protein   52.5 H   (<10.0)  mg/L


 


Total Protein   6.2 L   (6.3-8.2)  g/dL


 


Albumin   3.3 L   (3.5-5.0)  g/dL














Assessment and Plan


Plan: 


-Hypoxemia secondary to Covid 19 pneumonia.  Patient chest x-ray showing 

significant infiltrate and she much worse compared to his previous ER visits.  

Patient was started on Decadron, zinc, vitamin C, Pepcid.  Patient was started 

on IV fluids.  Monitor inflammatory markers 


-hypovolemic hyponatremia: IV fluids at 100 mL/h repeat basic metabolic profile 

tomorrow


-Hypokalemia potassium will be replaced


-DVT prophylaxis: Lovenox

## 2021-03-21 LAB
ANION GAP SERPL CALC-SCNC: 7 MMOL/L (ref 4–12)
BUN SERPL-SCNC: 17 MG/DL (ref 9–27)
BUN/CREAT SERPL: 24.29 RATIO (ref 12–20)
CALCIUM SPEC-MCNC: 8.6 MG/DL (ref 8.7–10.3)
CHLORIDE SERPL-SCNC: 107 MMOL/L (ref 96–109)
CO2 SERPL-SCNC: 25 MMOL/L (ref 21.6–31.8)
GLUCOSE SERPL-MCNC: 132 MG/DL (ref 70–110)
POTASSIUM SERPL-SCNC: 4.5 MMOL/L (ref 3.5–5.5)
SODIUM SERPL-SCNC: 139 MMOL/L (ref 135–145)

## 2021-03-21 RX ADMIN — CEFAZOLIN SCH: 330 INJECTION, POWDER, FOR SOLUTION INTRAMUSCULAR; INTRAVENOUS at 03:19

## 2021-03-21 RX ADMIN — OXYCODONE HYDROCHLORIDE AND ACETAMINOPHEN SCH MG: 500 TABLET ORAL at 20:24

## 2021-03-21 RX ADMIN — ENOXAPARIN SODIUM SCH MG: 40 INJECTION SUBCUTANEOUS at 09:09

## 2021-03-21 RX ADMIN — Medication SCH MG: at 09:09

## 2021-03-21 RX ADMIN — Medication SCH MCG: at 12:17

## 2021-03-21 RX ADMIN — FAMOTIDINE SCH MG: 20 TABLET, FILM COATED ORAL at 20:24

## 2021-03-21 RX ADMIN — FAMOTIDINE SCH MG: 20 TABLET, FILM COATED ORAL at 09:09

## 2021-03-21 RX ADMIN — OXYCODONE HYDROCHLORIDE AND ACETAMINOPHEN SCH MG: 500 TABLET ORAL at 09:09

## 2021-03-21 NOTE — P.PN
Subjective





51-year-old male presenting for evaluation of increased cough and dyspnea.  

Patient was diagnosed with coronavirus approximately 11 days ago and had cold 

symptoms for about 2 weeks.  He's had worsening dyspnea.  He's had increased 

generalized weakness and fatigue.  He has had persistent fevers.  Patient states

he is otherwise healthy, no chronic medical conditions.  He's had no significant

vomiting or diarrhea.  Patient also complaining of for dehydration 

lightheadedness and patient does have low sodium and low potassium patient will 

be started on IV fluids patient does have elevated inflammatory markers although

d-dimer is only 0.52





03/21/2021


Patient remains on 2 L of oxygen saturations remain in the low 90s patient is 

presently on systemic steroids.





Constitutional: Denied any fatigue denied any fever.


Cardio vascular: denied any chest pain, palpitations


Gastrointestinal denied any nausea vomiting


Pulmonary: Denied any shortness of breath cough


Neurologic denied any new focal deficits





All inpatient medications were reviewed and appropriate changes in these 

medications as dictated in the interval history and assessment and plan.








Objective





- Vital Signs


Vital signs: 


                                   Vital Signs











Temp  98.2 F   03/21/21 09:03


 


Pulse  77   03/21/21 09:03


 


Resp  18   03/21/21 09:03


 


BP  157/89   03/21/21 09:03


 


Pulse Ox  91 L  03/21/21 09:03








                                 Intake & Output











 03/20/21 03/21/21 03/21/21





 18:59 06:59 18:59


 


Intake Total  1200 


 


Balance  1200 


 


Weight 90.265 kg 90.265 kg 


 


Intake:   


 


  Intake, IV Titration  1200 





  Amount   


 


    Sodium Chloride 0.9% 1,  1200 





    000 ml @ 100 mls/hr IV .   





    Q10H [facility] Rx#:[id number]   


 


Other:   


 


  # Voids  2 














- Exam








PHYSICAL EXAMINATION: 





GENERAL: The patient is alert and oriented x3, not in any acute distress. Well 

developed, well nourished. 


HEENT: Pupils are round and equally reacting to light. EOMI. No scleral icterus.

No conjunctival pallor. Normocephalic, atraumatic. No pharyngeal erythema. No 

thyromegaly. 


CARDIOVASCULAR: S1 and S2 present. No murmurs, rubs, or gallops. 


PULMONARY: Chest is clear to auscultation, no wheezing or crackles. 


ABDOMEN: Soft, nontender, nondistended, normoactive bowel sounds. No palpable 

organomegaly. 


MUSCULOSKELETAL: No joint swelling or deformity.


EXTREMITIES: No cyanosis, clubbing, or pedal edema. 


NEUROLOGICAL: Gross neurological examination did not reveal any focal deficits. 


SKIN: No rashes. 

















- Labs


CBC & Chem 7: 


                                 03/20/21 13:16





                                 03/21/21 06:36


Labs: 


                  Abnormal Lab Results - Last 24 Hours (Table)











  03/20/21 03/20/21 03/20/21 Range/Units





  13:16 13:16 13:16 


 


Lymphocytes #  0.5 L    (1.0-4.8)  k/uL


 


Sodium   133 L   (137-145)  mmol/L


 


Potassium   3.4 L   (3.5-5.1)  mmol/L


 


BUN/Creatinine Ratio     (12.00-20.00)  Ratio


 


Glucose   141 H   (74-99)  mg/dL


 


Plasma Lactic Acid Kaden    2.3 H*  (0.7-2.0)  mmol/L


 


Calcium     (8.7-10.3)  mg/dL


 


Ferritin   1255.7 H   (22.0-322.0)  ng/mL


 


Lactate Dehydrogenase   771 H   (313-618)  U/L


 


C-Reactive Protein   52.5 H   (<10.0)  mg/L


 


Total Protein   6.2 L   (6.3-8.2)  g/dL


 


Albumin   3.3 L   (3.5-5.0)  g/dL














  03/21/21 Range/Units





  06:36 


 


Lymphocytes #   (1.0-4.8)  k/uL


 


Sodium   (137-145)  mmol/L


 


Potassium   (3.5-5.1)  mmol/L


 


BUN/Creatinine Ratio  24.29 H  (12.00-20.00)  Ratio


 


Glucose  132 H  (74-99)  mg/dL


 


Plasma Lactic Acid Kaden   (0.7-2.0)  mmol/L


 


Calcium  8.6 L  (8.7-10.3)  mg/dL


 


Ferritin   (22.0-322.0)  ng/mL


 


Lactate Dehydrogenase   (313-618)  U/L


 


C-Reactive Protein   (<10.0)  mg/L


 


Total Protein   (6.3-8.2)  g/dL


 


Albumin   (3.5-5.0)  g/dL














Assessment and Plan


Plan: 


-Hypoxemia secondary to Covid 19 pneumonia.  Patient chest x-ray showing 

significant infiltrate and she much worse compared to his previous ER visits.  

Patient is on Decadron, zinc, vitamin C, Pepcid.  Patient was started on IV 

fluids.  Monitor inflammatory markers 


-hypovolemic hyponatremia: IV fluids at 100 mL/h repeat basic metabolic profile 

tomorrow


-Hypokalemia potassium will be replaced


-DVT prophylaxis: Lovenox

## 2021-03-21 NOTE — P.CNPUL
History of Present Illness


Consult date: 03/21/21


Requesting physician: Murray Christianson


Reason for consult: dyspnea, cough, hypoxemia, pneumonia, abnormal CXR/CT


Chief complaint: Shortness of breath, cough.


History of present illness: 





51-year-old male, who presents to the emergency department on March 20, at about

1:00 PM, with complaints of increasing shortness of breath.  The patient was 

apparently diagnosed with COVID infection 11 or 12 days ago.  At that time, the 

patient had worsening shortness of breath, generalized weakness and fatigue, 

intermittent fevers, poor appetite, with multiple aches and soreness.  Because 

of worsening shortness of breath, and just generally not feeling well, he 

decided to come in to be evaluated.  Apparently, the patient reports no 

significant past medical history, other than for diverticular disease with 

abscess.  Apparently, the patient only takes Colace at home, and [place]line.  The 

patient was apparently a former smoker as well.  White count 5.9, hemoglobin 

14.1, hematocrit 40.7, platelet count 331,000.  PT, INR, PTT, and d-dimer are 

all normal.  Sodium 139, potassium 4.5, chlorides 107, CO2 25, anion gap 7, BUN 

17 and creatinine was 0.7.  Lactic acid was 2.3, and repeat was 1.  Ferritin 

1256, , C-reactive protein 53 and pro-calcitonin level was 0.07.  Chest 

x-ray was done on the 20th, and compared to a chest x-ray that was done on the 

14th.  The more recent chest x-ray shows bilateral worsening patchy infiltrates.





Review of Systems





REVIEW OF SYSTEMS:  


CONSTITUTIONAL: Weakness, fatigue, fever, muscle aches and soreness.


NEUROLOGIC: [ Negative.]


HEENT:  [ Negative.]


CARDIAC:  [Negative.]


PULMONARY: Shortness of breath, chest congestion, cough.


GI: Decreased intake of food and fluid.


:  [Negative.]


RHEUMATOLOGIC: [ Negative.]


IMMUNOLOGIC: [ Negative.]


ENDOCRINE:  [Negative.  ] 


DERMATOLOGIC: [Negative.]








Past Medical History


Past Medical History: No Reported History


Additional Past Medical History / Comment(s): diverticulitis with abscess


History of Any Multi-Drug Resistant Organisms: None Reported


Past Surgical History: Cholecystectomy


Additional Past Surgical History / Comment(s): vasectomy


Past Anesthesia/Blood Transfusion Reactions: Previous Problems w/ Anesthesia


Additional Past Anesthesia/Blood Transfusion Reaction / Comment(s): difficult 

intubation with cholecystectomy intubated through nose


Past Psychological History: No Psychological Hx Reported


Smoking Status: Former smoker


Past Alcohol Use History: Occasional


Additional Past Alcohol Use History / Comment(s): smoked 20-30 years 1 pack or 

less/day quit


Past Drug Use History: None Reported





- Past Family History


  ** Father


Family Medical History: Hypertension


Additional Family Medical History / Comment(s): heart issues with blood vessels.





  ** Mother


Family Medical History: No Reported History


Additional Family Medical History / Comment(s): colitis





Medications and Allergies


                                Home Medications











 Medication  Instructions  Recorded  Confirmed  Type


 


Acetaminophen Tab [Tylenol] 1,000 mg PO ONCE PRN 03/20/21 03/20/21 History


 


Melatonin (Unknown Strength) 1 tab PO ONCE PRN 03/20/21 03/20/21 History








                                    Allergies











Allergy/AdvReac Type Severity Reaction Status Date / Time


 


Penicillins Allergy  Swelling Verified 03/20/21 19:31














Physical Exam


Osteopathic Statement: *.  No significant issues noted on an osteopathic 

structural exam other than those noted in the History and Physical/Consult.


Vitals: 


                                   Vital Signs











  Temp Pulse Pulse Resp BP BP Pulse Ox


 


 03/21/21 13:46  97.7 F   66  18   145/90  93 L


 


 03/21/21 09:03  98.2 F   77  18   157/89  91 L


 


 03/21/21 05:30  98.4 F   64  16   146/75  92 L


 


 03/21/21 02:35  98.3 F   68  21   173/91  91 L


 


 03/20/21 22:41  98.2 F   72  16   149/81  92 L


 


 03/20/21 20:05  98.4 F   78  17   161/94  93 L


 


 03/20/21 20:00    78  17   


 


 03/20/21 17:53   80   18  155/98   94 L


 


 03/20/21 16:00     20   








                                Intake and Output











 03/21/21 03/21/21 03/21/21





 06:59 14:59 22:59


 


Intake Total 1200  


 


Balance 1200  


 


Intake:   


 


  Intake, IV Titration 1200  





  Amount   


 


    Sodium Chloride 0.9% 1, 1200  





    000 ml @ 100 mls/hr IV .   





    Q10H JONO Rx#:[id number]   


 


Other:   


 


  # Voids 2  














No acute distress, oriented 3.  Patient sitting up at bedside.  Currently on O2

 at 2 L.  No conversational dyspnea or use of accessory muscles.





HEENT examination is grossly unremarkable.  Mucous membranes are moist.  No oral

 lesions.





Neck supple.  Full range of motion.  No adenopathy thyromegaly or neck vein 

distention.





Cardiovascular examination reveals regular rhythm rate.  S1-S2 normal.  No S3 or

 S4.  No discernible murmur noted.  Heart rate is 66 bpm.





Lungs reveal mild diffuse rhonchi and a few scattered crackles.  No wheezes.  

Breath sounds equal bilaterally.





Abdomen soft bowel sounds are heard.  No masses or tenderness.





Extremities are intact.  No cyanosis clubbing or edema.





Skin is without rash or lesion.





Neurologic examination is brief but nonfocal.





Results





- Laboratory Findings


CBC and BMP: 


                                 03/20/21 13:16





                                 03/21/21 06:36


PT/INR, D-dimer











PT  10.7 sec (9.0-12.0)   03/20/21  13:16    


 


INR  1.0  (<1.2)   03/20/21  13:16    


 


D-Dimer  0.52 mg/L FEU (<0.60)   03/20/21  13:16    








Abnormal lab findings: 


                                  Abnormal Labs











  03/20/21 03/20/21 03/20/21





  13:16 13:16 13:16


 


Lymphocytes #  0.5 L  


 


Sodium   133 L 


 


Potassium   3.4 L 


 


BUN/Creatinine Ratio   


 


Glucose   141 H 


 


Plasma Lactic Acid Kaden    2.3 H*


 


Calcium   


 


Ferritin   1255.7 H 


 


Lactate Dehydrogenase   771 H 


 


C-Reactive Protein   52.5 H 


 


Total Protein   6.2 L 


 


Albumin   3.3 L 














  03/21/21





  06:36


 


Lymphocytes # 


 


Sodium 


 


Potassium 


 


BUN/Creatinine Ratio  24.29 H


 


Glucose  132 H


 


Plasma Lactic Acid Kaden 


 


Calcium  8.6 L


 


Ferritin 


 


Lactate Dehydrogenase 


 


C-Reactive Protein 


 


Total Protein 


 


Albumin 














- Diagnostic Findings


Chest x-ray: image reviewed





Assessment and Plan


Assessment: 





Acute mild/moderate hypoxemic respiratory failure secondary to COVID 19 

pneumonia.





Prior history of tobacco use.





History of diverticular disease.


Plan: 





Plan dated 03/21/2021.





The patient is only candidate for vitamin C, vitamin D3, zinc, Decadron, and 

Lovenox.  The patient is not a candidate for REM.  The patient will be watched 

very closely.  Clinically he looks well, but his chest x-ray has progressed.  We

 will continue to follow make recommendations were appropriate.  Prognosis is 

guarded.  Additional recommendations and suggestions are forthcoming.  Orders 

were put in on this patient.


Time with Patient: Greater than 30

## 2021-03-22 RX ADMIN — ENOXAPARIN SODIUM SCH MG: 40 INJECTION SUBCUTANEOUS at 08:31

## 2021-03-22 RX ADMIN — FAMOTIDINE SCH MG: 20 TABLET, FILM COATED ORAL at 08:32

## 2021-03-22 RX ADMIN — Medication SCH MG: at 08:32

## 2021-03-22 RX ADMIN — OXYCODONE HYDROCHLORIDE AND ACETAMINOPHEN SCH MG: 500 TABLET ORAL at 08:32

## 2021-03-22 RX ADMIN — Medication SCH MCG: at 08:32

## 2021-03-22 RX ADMIN — FAMOTIDINE SCH MG: 20 TABLET, FILM COATED ORAL at 21:11

## 2021-03-22 RX ADMIN — OXYCODONE HYDROCHLORIDE AND ACETAMINOPHEN SCH MG: 500 TABLET ORAL at 21:11

## 2021-03-22 NOTE — P.PN
Subjective


Progress Note Date: 03/22/21








51-year-old male, who presents to the emergency department on March 20, at about

1:00 PM, with complaints of increasing shortness of breath.  The patient was 

apparently diagnosed with COVID infection 11 or 12 days ago.  At that time, the 

patient had worsening shortness of breath, generalized weakness and fatigue, 

intermittent fevers, poor appetite, with multiple aches and soreness.  Because 

of worsening shortness of breath, and just generally not feeling well, he 

decided to come in to be evaluated.  Apparently, the patient reports no 

significant past medical history, other than for diverticular disease with 

abscess.  Apparently, the patient only takes Colace at home, and [place].  The 

patient was apparently a former smoker as well.  White count 5.9, hemoglobin 

14.1, hematocrit 40.7, platelet count 331,000.  PT, INR, PTT, and d-dimer are 

all normal.  Sodium 139, potassium 4.5, chlorides 107, CO2 25, anion gap 7, BUN 

17 and creatinine was 0.7.  Lactic acid was 2.3, and repeat was 1.  Ferritin 1

256, , C-reactive protein 53 and pro-calcitonin level was 0.07.  Chest x-

ray was done on the 20th, and compared to a chest x-ray that was done on the 

14th.  The more recent chest x-ray shows bilateral worsening patchy infiltrates.








03/22/2021 the patient is being seen for a follow-up.  51-year-old male patient 

was hospitalized for Covid 19 related pneumonia secondary hypoxic respiratory fa

ilure.  The patient is currently on Decadron 6 mg on a daily basis.  The patient

is doing well.  The patient is currently on 2 L about 2 by nasal cannula with a 

pulse of 75%.  The chest x-ray at time of admission showed worsening and patchy 

bilateral pulmonary infiltrates compatible with a pneumonia.  The patient's 

inflammatory markers showed a d-dimer of 0.52 and the patient is currently on 

Lovenox for DVT prophylaxis.  The patient also has a pro-calcitonin level THAT 

WAS LOW AT 0.07 WITH AN LDH LEVEL  AND A CRP OF 52.  These are 

inflammatory markers from yesterday.  No levels from today.  Chest x-ray from 

admission was showing bilateral pulmonary infiltrates, peripheral distribution 

with areas of consolidation.





Objective





- Vital Signs


Vital signs: 


                                   Vital Signs











Temp  97.8 F   03/22/21 10:13


 


Pulse  72   03/22/21 10:15


 


Resp  17   03/22/21 10:13


 


BP  130/83   03/22/21 10:15


 


Pulse Ox  93 L  03/22/21 10:15








                                 Intake & Output











 03/21/21 03/22/21 03/22/21





 18:59 06:59 18:59


 


Other:   


 


  Voiding Method  Toilet 


 


  # Voids 2 2 














- Exam











No acute distress, oriented 3.  Patient sitting up at bedside.  Currently on O2

at 2 L.  No conversational dyspnea or use of accessory muscles.





HEENT examination is grossly unremarkable.  Mucous membranes are moist.  No oral

lesions.





Neck supple.  Full range of motion.  No adenopathy thyromegaly or neck vein dis

tention.





Cardiovascular examination reveals regular rhythm rate.  S1-S2 normal.  No S3 or

S4.  No discernible murmur noted.   





Lungs reveal mild diffuse rhonchi and a few scattered crackles.  No wheezes.  

Breath sounds equal bilaterally.





Abdomen soft bowel sounds are heard.  No masses or tenderness.





Extremities are intact.  No cyanosis clubbing or edema.





Skin is without rash or lesion.





Neurologic examination is brief but nonfocal.





- Labs


CBC & Chem 7: 


                                 03/20/21 13:16





                                 03/21/21 06:36





Assessment and Plan


Plan: 








1 Acute mild/moderate hypoxemic respiratory failure secondary to COVID 19 

pneumonia.   The patient has already been diagnosed having Covid infection 

approximately  12 days prior to him coming to the hospital.  She is currently on

2 L of oxygen by nasal cannula.  He is on Decadron.





2 Prior history of tobacco use.





3 History of diverticular disease.








Plan





Keep the patient on 2 L of oxygen by nasal cannula and on today's evaluation we 

checked the patient on room air oxygen and the patient's pulse ox was above 92-

93% after 5 minutes.  We may be able to wean him down further to room air 

oxygen.


Continue Decadron


Monitor oxygenation


Monitor inflammatory markers


Lovenox for DVT prophylaxis


We'll follow

## 2021-03-23 VITALS — DIASTOLIC BLOOD PRESSURE: 81 MMHG | SYSTOLIC BLOOD PRESSURE: 146 MMHG | TEMPERATURE: 98 F

## 2021-03-23 VITALS — RESPIRATION RATE: 18 BRPM | HEART RATE: 63 BPM

## 2021-03-23 LAB
ANION GAP SERPL CALC-SCNC: 12.3 MMOL/L (ref 4–12)
BASOPHILS # BLD AUTO: 0.01 X 10*3/UL (ref 0–0.1)
BASOPHILS NFR BLD AUTO: 0.1 %
BUN SERPL-SCNC: 14 MG/DL (ref 9–27)
BUN/CREAT SERPL: 20 RATIO (ref 12–20)
CALCIUM SPEC-MCNC: 8.6 MG/DL (ref 8.7–10.3)
CHLORIDE SERPL-SCNC: 105 MMOL/L (ref 96–109)
CO2 SERPL-SCNC: 23.7 MMOL/L (ref 21.6–31.8)
EOSINOPHIL # BLD AUTO: 0.01 X 10*3/UL (ref 0.04–0.35)
EOSINOPHIL NFR BLD AUTO: 0.1 %
ERYTHROCYTE [DISTWIDTH] IN BLOOD BY AUTOMATED COUNT: 4.44 X 10*6/UL (ref 4.4–5.6)
ERYTHROCYTE [DISTWIDTH] IN BLOOD: 12.4 % (ref 11.5–14.5)
GLUCOSE SERPL-MCNC: 183 MG/DL (ref 70–110)
HCT VFR BLD AUTO: 39.6 % (ref 39.6–50)
HGB BLD-MCNC: 13 G/DL (ref 13–17)
LYMPHOCYTES # SPEC AUTO: 1.83 X 10*3/UL (ref 0.9–5)
LYMPHOCYTES NFR SPEC AUTO: 20.8 %
MCH RBC QN AUTO: 29.3 PG (ref 27–32)
MCHC RBC AUTO-ENTMCNC: 32.8 G/DL (ref 32–37)
MCV RBC AUTO: 89.2 FL (ref 80–97)
MONOCYTES # BLD AUTO: 0.7 X 10*3/UL (ref 0.2–1)
MONOCYTES NFR BLD AUTO: 8 %
NEUTROPHILS # BLD AUTO: 6.2 X 10*3/UL (ref 1.8–7.7)
NEUTROPHILS NFR BLD AUTO: 70.5 %
PLATELET # BLD AUTO: 460 X 10*3/UL (ref 140–440)
POTASSIUM SERPL-SCNC: 4 MMOL/L (ref 3.5–5.5)
SODIUM SERPL-SCNC: 141 MMOL/L (ref 135–145)
WBC # BLD AUTO: 8.79 X 10*3/UL (ref 4.5–10)

## 2021-03-23 RX ADMIN — Medication SCH MG: at 08:36

## 2021-03-23 RX ADMIN — FAMOTIDINE SCH MG: 20 TABLET, FILM COATED ORAL at 08:36

## 2021-03-23 RX ADMIN — Medication SCH MCG: at 08:36

## 2021-03-23 RX ADMIN — ENOXAPARIN SODIUM SCH MG: 40 INJECTION SUBCUTANEOUS at 08:35

## 2021-03-23 RX ADMIN — OXYCODONE HYDROCHLORIDE AND ACETAMINOPHEN SCH MG: 500 TABLET ORAL at 08:36

## 2021-03-23 NOTE — P.PN
Subjective


Progress Note Date: 03/23/21


Principal diagnosis: 








51-year-old male, who presents to the emergency department on March 20, at about

1:00 PM, with complaints of increasing shortness of breath.  The patient was 

apparently diagnosed with COVID infection 11 or 12 days ago.  At that time, the 

patient had worsening shortness of breath, generalized weakness and fatigue, 

intermittent fevers, poor appetite, with multiple aches and soreness.  Because 

of worsening shortness of breath, and just generally not feeling well, he 

decided to come in to be evaluated.  Apparently, the patient reports no 

significant past medical history, other than for diverticular disease with 

abscess.  Apparently, the patient only takes Colace at home, and [place].  The 

patient was apparently a former smoker as well.  White count 5.9, hemoglobin 

14.1, hematocrit 40.7, platelet count 331,000.  PT, INR, PTT, and d-dimer are 

all normal.  Sodium 139, potassium 4.5, chlorides 107, CO2 25, anion gap 7, BUN 

17 and creatinine was 0.7.  Lactic acid was 2.3, and repeat was 1.  Ferritin 

1256, , C-reactive protein 53 and pro-calcitonin level was 0.07.  Chest 

x-ray was done on the 20th, and compared to a chest x-ray that was done on the 

14th.  The more recent chest x-ray shows bilateral worsening patchy infiltrates.








03/22/2021 the patient is being seen for a follow-up.  51-year-old male patient 

was hospitalized for Covid 19 related pneumonia secondary hypoxic respiratory 

failure.  The patient is currently on Decadron 6 mg on a daily basis.  The 

patient is doing well.  The patient is currently on 2 L about 2 by nasal cannula

with a pulse of 75%.  The chest x-ray at time of admission showed worsening and 

patchy bilateral pulmonary infiltrates compatible with a pneumonia.  The 

patient's inflammatory markers showed a d-dimer of 0.52 and the patient is 

currently on Lovenox for DVT prophylaxis.  The patient also has a pro-calcitonin

level THAT WAS LOW AT 0.07 WITH AN LDH LEVEL  AND A CRP OF 52.  These are 

inflammatory markers from yesterday.  No levels from today.  Chest x-ray from 

admission was showing bilateral pulmonary infiltrates, peripheral distribution 

with areas of consolidation.

















51-year-old male, who presents to the emergency department on March 20, at about

1:00 PM, with complaints of increasing shortness of breath.  The patient was 

apparently diagnosed with COVID infection 11 or 12 days ago.  At that time, the 

patient had worsening shortness of breath, generalized weakness and fatigue, 

intermittent fevers, poor appetite, with multiple aches and soreness.  Because 

of worsening shortness of breath, and just generally not feeling well, he 

decided to come in to be evaluated.  Apparently, the patient reports no 

significant past medical history, other than for diverticular disease with absc

ess.  Apparently, the patient only takes Colace at home, and [place].  The patient

was apparently a former smoker as well.  White count 5.9, hemoglobin 14.1, 

hematocrit 40.7, platelet count 331,000.  PT, INR, PTT, and d-dimer are all 

normal.  Sodium 139, potassium 4.5, chlorides 107, CO2 25, anion gap 7, BUN 17 

and creatinine was 0.7.  Lactic acid was 2.3, and repeat was 1.  Ferritin 1256, 

, C-reactive protein 53 and pro-calcitonin level was 0.07.  Chest x-ray 

was done on the 20th, and compared to a chest x-ray that was done on the 14th.  

The more recent chest x-ray shows bilateral worsening patchy infiltrates.








03/22/2021 the patient is being seen for a follow-up.  51-year-old male patient 

was hospitalized for Covid 19 related pneumonia secondary hypoxic respiratory 

failure.  The patient is currently on Decadron 6 mg on a daily basis.  The 

patient is doing well.  The patient is currently on 2 L about 2 by nasal cannula

with a pulse of 75%.  The chest x-ray at time of admission showed worsening and 

patchy bilateral pulmonary infiltrates compatible with a pneumonia.  The patient

's inflammatory markers showed a d-dimer of 0.52 and the patient is currently on

Lovenox for DVT prophylaxis.  The patient also has a pro-calcitonin level THAT 

WAS LOW AT 0.07 WITH AN LDH LEVEL  AND A CRP OF 52.  These are 

inflammatory markers from yesterday.  No levels from today.  Chest x-ray from 

admission was showing bilateral pulmonary infiltrates, peripheral distribution 

with areas of consolidation.








03/23/2021, the patient is doing much better and the patient is currently on 

room air oxygen.  This patient has Covid 19 related pneumonia with hypoxic 

respiratory failure patient is improved.  The patient is on Decadron 6 mg by 

mouth daily without any side effects.  No new complaints from today.  The 

patient is ambulating.  The patient on Lovenox 40 mg subcu for DVT prophylaxis. 

Labs from today showed a white cell count of a 8.7 with a platelet count of 460.

 No other issues otherwise for now.





Objective





- Vital Signs


Vital signs: 


                                   Vital Signs











Temp  97.6 F   03/23/21 10:00


 


Pulse  63   03/23/21 10:00


 


Resp  18   03/23/21 10:00


 


BP  135/83   03/23/21 10:00


 


Pulse Ox  93 L  03/23/21 10:00








                                 Intake & Output











 03/22/21 03/23/21 03/23/21





 18:59 06:59 18:59


 


Intake Total  540 


 


Balance  540 


 


Intake:   


 


  Oral  540 


 


Other:   


 


  Voiding Method  Toilet 


 


  # Voids 2 4 














- Exam











No acute distress, oriented 3.  Patient sitting up at bedside.  Currently on 

RA.  No conversational dyspnea or use of accessory muscles.





HEENT examination is grossly unremarkable.  Mucous membranes are moist.  No oral

lesions.





Neck supple.  Full range of motion.  No adenopathy thyromegaly or neck vein 

distention.





Cardiovascular examination reveals regular rhythm rate.  S1-S2 normal.  No S3 or

S4.  No discernible murmur noted.   





Lungs reveal mild diffuse rhonchi and a few scattered crackles.  No wheezes.  

Breath sounds equal bilaterally.





Abdomen soft bowel sounds are heard.  No masses or tenderness.





Extremities are intact.  No cyanosis clubbing or edema.





Skin is without rash or lesion.





Neurologic examination is brief but nonfocal.





- 





- Labs


CBC & Chem 7: 


                                 03/23/21 06:39





                                 03/21/21 06:36


Labs: 


                  Abnormal Lab Results - Last 24 Hours (Table)











  03/23/21 Range/Units





  06:39 


 


Plt Count  460 H  (140-440)  X 10*3/uL


 


Eosinophils #  0.01 L  (0.04-0.35)  X 10*3/uL














Assessment and Plan


Plan: 











1 Acute mild/moderate hypoxemic respiratory failure secondary to COVID 19 

pneumonia.   The patient has already been diagnosed having Covid infection 

approximately 12 days prior to him coming to the hospital.  She is currently RA 

oxygen by nasal cannula.  He is on Decadron.





2 Prior history of tobacco use.





3 History of diverticular disease.








Plan





Keep the patient on room air oxygen


Continue Decadron 6 mg by mouth daily to complete a total of 10 day course


Monitor oxygenation


Monitor inflammatory markers


Lovenox for DVT prophylaxis


We'll follow, possible discharge home today and the patient is quite stable for 

now.  He will need outpatient steroids.  We will recommend a pulse oximeter that

he needs to purchase on outpatient basis and monitor his oxygen.

## 2021-03-23 NOTE — P.PN
Subjective


Progress Note Date: 03/22/21


Principal diagnosis: 





Acute Covid 19 pneumonia





51-year-old male presenting for evaluation of increased cough and dyspnea.  

Patient was diagnosed with coronavirus approximately 11 days ago and had cold 

symptoms for about 2 weeks.  He's had worsening dyspnea.  He's had increased ge

neralized weakness and fatigue.  He has had persistent fevers.  Patient states 

he is otherwise healthy, no chronic medical conditions.  He's had no significant

vomiting or diarrhea.  Patient also complaining of for dehydration 

lightheadedness and patient does have low sodium and low potassium patient will 

be started on IV fluids patient does have elevated inflammatory markers although

d-dimer is only 0.52





03/21/2021


Patient remains on 2 L of oxygen saturations remain in the low 90s patient is p

resently on systemic steroids.





03/22/2021


Patient is currently resting in the bed.  He was complaining of dizziness when 

he gets up from bed.  Orthostatic vitals were checked and were positive.  Tracy

ent was given fluid bolus with improvement in symptoms.  Otherwise patient is 

respiratory status is improving and is currently on 2 L oxygen via nasal 

cannula.


Patient is being continued on dexamethasone, Lovenox and multivitamins.  

Pulmonary is following.





Constitutional: Denied any fatigue denied any fever.


Cardio vascular: denied any chest pain, palpitations


Gastrointestinal denied any nausea vomiting


Pulmonary: Denied any worsening shortness of breath or cough


Neurologic denied any new focal deficits





All inpatient medications were reviewed and appropriate changes in these 

medications as dictated in the interval history and assessment and plan.








Objective





- Vital Signs


Vital signs: 


                                   Vital Signs











Temp  97.8 F   03/22/21 10:13


 


Pulse  72   03/22/21 10:15


 


Resp  17   03/22/21 10:13


 


BP  130/83   03/22/21 10:15


 


Pulse Ox  93 L  03/22/21 10:15








                                 Intake & Output











 03/21/21 03/22/21 03/22/21





 18:59 06:59 18:59


 


Other:   


 


  Voiding Method  Toilet 


 


  # Voids 2 2 














- Exam








PHYSICAL EXAMINATION: 





GENERAL: The patient is alert and oriented x3, not in any acute distress. Well 

developed, well nourished. 


HEENT: Pupils are round and equally reacting to light. EOMI. No scleral icterus.

No conjunctival pallor. Normocephalic, atraumatic. No pharyngeal erythema. No 

thyromegaly. 


CARDIOVASCULAR: S1 and S2 present. No murmurs, rubs, or gallops. 


PULMONARY: Chest is clear to auscultation, no wheezing or crackles. 


ABDOMEN: Soft, nontender, nondistended, normoactive bowel sounds. No palpable 

organomegaly. 


MUSCULOSKELETAL: No joint swelling or deformity.


EXTREMITIES: No cyanosis, clubbing, or pedal edema. 


NEUROLOGICAL: Gross neurological examination did not reveal any focal deficits. 


SKIN: No rashes. 











- Labs


CBC & Chem 7: 


                                 03/23/21 06:39





                                 03/23/21 06:39





Assessment and Plan


Assessment: 





-Hypoxemia secondary to acute Covid 19 pneumonia.  Was diagnosed 4 days prior to

admission.  Patient chest x-ray showing significant infiltrate and she much 

worse compared to his previous ER visits.  


Patient is on Decadron, zinc, vitamin C, Pepcid.  Patient was started on IV 

fluids.  Monitor inflammatory markers 


-hypovolemic hyponatremia: IV fluids at 100 mL/h.  Sodium level improved.


-Hypokalemia potassium was replaced


Lactic acidosis resolved secondary to hypovolemia.-


-DVT prophylaxis: Lovenox





Time with Patient: Greater than 30

## 2021-08-18 ENCOUNTER — HOSPITAL ENCOUNTER (EMERGENCY)
Dept: HOSPITAL 47 - EC | Age: 51
Discharge: HOME | End: 2021-08-18
Payer: COMMERCIAL

## 2021-08-18 VITALS — TEMPERATURE: 99.2 F

## 2021-08-18 VITALS — RESPIRATION RATE: 20 BRPM

## 2021-08-18 VITALS — SYSTOLIC BLOOD PRESSURE: 157 MMHG | DIASTOLIC BLOOD PRESSURE: 98 MMHG

## 2021-08-18 VITALS — HEART RATE: 68 BPM

## 2021-08-18 DIAGNOSIS — Z20.822: ICD-10-CM

## 2021-08-18 DIAGNOSIS — J18.9: Primary | ICD-10-CM

## 2021-08-18 DIAGNOSIS — Z88.0: ICD-10-CM

## 2021-08-18 DIAGNOSIS — Z87.891: ICD-10-CM

## 2021-08-18 LAB
ALBUMIN SERPL-MCNC: 4.2 G/DL (ref 3.5–5)
ALP SERPL-CCNC: 59 U/L (ref 38–126)
ALT SERPL-CCNC: 29 U/L (ref 4–49)
ANION GAP SERPL CALC-SCNC: 9 MMOL/L
APTT BLD: 25.2 SEC (ref 22–30)
AST SERPL-CCNC: 23 U/L (ref 17–59)
BASOPHILS # BLD AUTO: 0 K/UL (ref 0–0.2)
BASOPHILS NFR BLD AUTO: 1 %
BUN SERPL-SCNC: 13 MG/DL (ref 9–20)
CALCIUM SPEC-MCNC: 10 MG/DL (ref 8.4–10.2)
CHLORIDE SERPL-SCNC: 108 MMOL/L (ref 98–107)
CO2 SERPL-SCNC: 23 MMOL/L (ref 22–30)
EOSINOPHIL # BLD AUTO: 0.2 K/UL (ref 0–0.7)
EOSINOPHIL NFR BLD AUTO: 2 %
ERYTHROCYTE [DISTWIDTH] IN BLOOD BY AUTOMATED COUNT: 5.26 M/UL (ref 4.3–5.9)
ERYTHROCYTE [DISTWIDTH] IN BLOOD: 14 % (ref 11.5–15.5)
GLUCOSE SERPL-MCNC: 100 MG/DL (ref 74–99)
HCT VFR BLD AUTO: 47.5 % (ref 39–53)
HGB BLD-MCNC: 16.2 GM/DL (ref 13–17.5)
INR PPP: 1 (ref ?–1.2)
LYMPHOCYTES # SPEC AUTO: 2.2 K/UL (ref 1–4.8)
LYMPHOCYTES NFR SPEC AUTO: 26 %
MCH RBC QN AUTO: 30.8 PG (ref 25–35)
MCHC RBC AUTO-ENTMCNC: 34.1 G/DL (ref 31–37)
MCV RBC AUTO: 90.4 FL (ref 80–100)
MONOCYTES # BLD AUTO: 0.5 K/UL (ref 0–1)
MONOCYTES NFR BLD AUTO: 7 %
NEUTROPHILS # BLD AUTO: 5.3 K/UL (ref 1.3–7.7)
NEUTROPHILS NFR BLD AUTO: 63 %
PLATELET # BLD AUTO: 265 K/UL (ref 150–450)
POTASSIUM SERPL-SCNC: 3.6 MMOL/L (ref 3.5–5.1)
PROT SERPL-MCNC: 6.7 G/DL (ref 6.3–8.2)
PT BLD: 10.3 SEC (ref 9–12)
SODIUM SERPL-SCNC: 140 MMOL/L (ref 137–145)
WBC # BLD AUTO: 8.4 K/UL (ref 3.8–10.6)

## 2021-08-18 PROCEDURE — 93005 ELECTROCARDIOGRAM TRACING: CPT

## 2021-08-18 PROCEDURE — 80053 COMPREHEN METABOLIC PANEL: CPT

## 2021-08-18 PROCEDURE — 36415 COLL VENOUS BLD VENIPUNCTURE: CPT

## 2021-08-18 PROCEDURE — 96360 HYDRATION IV INFUSION INIT: CPT

## 2021-08-18 PROCEDURE — 71045 X-RAY EXAM CHEST 1 VIEW: CPT

## 2021-08-18 PROCEDURE — 87635 SARS-COV-2 COVID-19 AMP PRB: CPT

## 2021-08-18 PROCEDURE — 99285 EMERGENCY DEPT VISIT HI MDM: CPT

## 2021-08-18 PROCEDURE — 85025 COMPLETE CBC W/AUTO DIFF WBC: CPT

## 2021-08-18 PROCEDURE — 85610 PROTHROMBIN TIME: CPT

## 2021-08-18 PROCEDURE — 85730 THROMBOPLASTIN TIME PARTIAL: CPT

## 2021-08-18 PROCEDURE — 83605 ASSAY OF LACTIC ACID: CPT

## 2021-08-18 PROCEDURE — 71275 CT ANGIOGRAPHY CHEST: CPT

## 2021-08-18 PROCEDURE — 96361 HYDRATE IV INFUSION ADD-ON: CPT

## 2021-08-18 PROCEDURE — 83880 ASSAY OF NATRIURETIC PEPTIDE: CPT

## 2021-08-18 PROCEDURE — 84484 ASSAY OF TROPONIN QUANT: CPT

## 2021-08-18 NOTE — CT
EXAMINATION TYPE: CT chest angio for PE

 

DATE OF EXAM: 8/18/2021

 

COMPARISON: None

 

HISTORY: shortness of breath, high blood pressure

 

CT DLP: 355.5 mGycm

Automated exposure control for dose reduction was used.

 

CONTRAST: 

Performed with IV Contrast, patient injected with 100 mL of Isovue 370.

 

There are 3-D post processed images.

 

There is some interstitial infiltrates in the posterior lung fields. There is no pleural effusion. He
art size is normal. There is no pericardial effusion.

 

There is no mediastinal adenopathy. There are no hilar masses. Bony thorax is intact.

 

The thoracic aorta is intact. There is no aneurysm or dissection. Ascending aorta measures 3.5 cm. Th
e thoracic spine is intact. There is no compression fracture.

 

There is normal contrast opacification of the pulmonary arteries. There are no filling defects.

 

IMPRESSION:

No evidence of pulmonary embolism. Mild pulmonary interstitial posterior infiltrates.

## 2021-08-18 NOTE — XR
EXAMINATION TYPE: XR chest 1V portable

 

DATE OF EXAM: 8/18/2021

 

COMPARISON: 3/20/2021

 

HISTORY: Short of breath

 

TECHNIQUE:

 

FINDINGS: Heart is normal. Lungs are clear of infiltrate. There is no heart failure. There are chest 
leads. Bony thorax is intact.

 

IMPRESSION: No active cardiopulmonary disease. There is clearing of the bilateral patchy pulmonary in
filtrates compared to old exam.

## 2021-08-18 NOTE — ED
SOB HPI





- General


Chief Complaint: Shortness of Breath


Stated Complaint: SOB, elevated BP


Time Seen by Provider: 08/18/21 15:20


Source: patient


Mode of arrival: wheelchair


Limitations: no limitations





- History of Present Illness


Initial Comments: 


Sukhi to 1-year-old male presents the emergency department today via private 

vehicle for evaluation of shortness of breath and tingling primarily in his 

arms.  Patient had COVID in March of this year, he subsequently was vaccinated, 

he has not been able to follow up with pulmonology but is scheduled to see Dr. Ortiz on the 26th of this month.  Patient states that this morning he began 

feeling short of breath.  He states that he didn't feel like he was wheezing and

there was any tightness in his chest but he did use his wife's nebulizer with no

improvement in his symptoms.  Patient describes feeling as though the areas too 

sick and he can get in his lungs.  He is not having any chest pain, palpitations

or diaphoresis.  Symptoms have persisted for nearly 3 hours which prompted him 

to come to the ER for evaluation.  Patient states he checked his blood pressure,

heart rate and oxygen levels at home, he was hypertensive and his heart rate was

in the 90s which is elevated for him but his oxygen remained 99%.





She is a nonsmoker with no history of heart disease.  Does have a family history

of heart disease.








- Related Data


                                Home Medications











 Medication  Instructions  Recorded  Confirmed


 


Acetaminophen Tab [Tylenol] 1,000 mg PO ONCE PRN 03/20/21 03/20/21


 


Melatonin (Unknown Strength) 1 tab PO ONCE PRN 03/20/21 03/20/21








                                  Previous Rx's











 Medication  Instructions  Recorded


 


Dexamethasone [Decadron] 6 mg PO DAILY 8 Days #8 tablet 03/23/21


 


Azithromycin 250 mg PO DAILY 5 Days #5 tab 08/18/21











                                    Allergies











Allergy/AdvReac Type Severity Reaction Status Date / Time


 


Penicillins Allergy  Swelling Verified 08/18/21 14:55














Review of Systems


ROS Statement: 


Those systems with pertinent positive or pertinent negative responses have been 

documented in the HPI.





ROS Other: All systems not noted in ROS Statement are negative.





Past Medical History


Past Medical History: No Reported History


Additional Past Medical History / Comment(s): diverticulitis with abscess


History of Any Multi-Drug Resistant Organisms: None Reported


Past Surgical History: Cholecystectomy


Additional Past Surgical History / Comment(s): vasectomy


Past Anesthesia/Blood Transfusion Reactions: Previous Problems w/ Anesthesia


Additional Past Anesthesia/Blood Transfusion Reaction / Comment(s): difficult 

intubation with cholecystectomy intubated through nose


Past Psychological History: No Psychological Hx Reported


Smoking Status: Former smoker


Past Alcohol Use History: Occasional


Past Drug Use History: None Reported





- Past Family History


  ** Father


Family Medical History: Hypertension


Additional Family Medical History / Comment(s): heart issues with blood vessels.





  ** Mother


Family Medical History: No Reported History


Additional Family Medical History / Comment(s): colitis





General Exam





- General Exam Comments


Initial Comments: 


Physical Exam


GENERAL:


Patient is well-developed and well-nourished.  Patient is nontoxic and well-

hydrated and is in no distress.





HENT:


Normocephalic, Atraumatic. 





EYES:


PERRL, EOMI





PULMONARY:


Unlabored respirations.  No audible rales rhonchi or wheezing was noted.





CARDIOVASCULAR:


There is a regular rate and rhythm without any murmurs gallops or rubs.  





ABDOMEN:


Soft and nontender with normal bowel sounds. 





SKIN:


Skin is clear with no lesions or rashes and otherwise unremarkable.





: 


Deferred





NEUROLOGIC:


Patient is alert and oriented x3.  Moving all extremities spontaneously





MUSCULOSKELETAL:


Normal extremities with adequate strength and full range of motion.  No lower 

extremity swelling or edema.  No calf tenderness.  





PSYCHIATRIC:


Normal psychiatric evaluation.





Limitations: no limitations





Course


                                   Vital Signs











  08/18/21 08/18/21 08/18/21





  14:51 15:55 16:38


 


Temperature 99.2 F  


 


Pulse Rate 78  


 


Respiratory 18 18 20





Rate   


 


Blood Pressure 156/95  


 


O2 Sat by Pulse 98  





Oximetry   














Medical Decision Making





- Medical Decision Making


She was seen and evaluated history is obtained from patient


Labs are obtained on her relatively unremarkable, computed tomography scan of 

the chest was obtained there is no PE however posterior interstitial infiltrates

 concerning for early pneumonia were identified.  Patient was started on 

azithromycin.  Patient remained afebrile, there is no tachycardia or hypoxia 

while in the emergency department.





When returning from CT patient did have an episode of becoming flushed and 

feeling as though he was going to pass out.  He had no wheezing, no hypoxia, no 

tachycardia, no hypotension at that time.  I suspect this was flushing secondary

 to the IV dye and not a true ALLERGIC reaction.








- Lab Data


Result diagrams: 


                                 08/18/21 16:33





                                 08/18/21 16:33


                                   Lab Results











  08/18/21 08/18/21 08/18/21 Range/Units





  16:33 16:33 16:33 


 


WBC  8.4    (3.8-10.6)  k/uL


 


RBC  5.26    (4.30-5.90)  m/uL


 


Hgb  16.2    (13.0-17.5)  gm/dL


 


Hct  47.5    (39.0-53.0)  %


 


MCV  90.4    (80.0-100.0)  fL


 


MCH  30.8    (25.0-35.0)  pg


 


MCHC  34.1    (31.0-37.0)  g/dL


 


RDW  14.0    (11.5-15.5)  %


 


Plt Count  265    (150-450)  k/uL


 


MPV  7.0    


 


Neutrophils %  63    %


 


Lymphocytes %  26    %


 


Monocytes %  7    %


 


Eosinophils %  2    %


 


Basophils %  1    %


 


Neutrophils #  5.3    (1.3-7.7)  k/uL


 


Lymphocytes #  2.2    (1.0-4.8)  k/uL


 


Monocytes #  0.5    (0-1.0)  k/uL


 


Eosinophils #  0.2    (0-0.7)  k/uL


 


Basophils #  0.0    (0-0.2)  k/uL


 


PT   10.3   (9.0-12.0)  sec


 


INR   1.0   (<1.2)  


 


APTT   25.2   (22.0-30.0)  sec


 


Sodium    140  (137-145)  mmol/L


 


Potassium    3.6  (3.5-5.1)  mmol/L


 


Chloride    108 H  ()  mmol/L


 


Carbon Dioxide    23  (22-30)  mmol/L


 


Anion Gap    9  mmol/L


 


BUN    13  (9-20)  mg/dL


 


Creatinine    0.80  (0.66-1.25)  mg/dL


 


Est GFR (CKD-EPI)AfAm    >90  (>60 ml/min/1.73 sqM)  


 


Est GFR (CKD-EPI)NonAf    >90  (>60 ml/min/1.73 sqM)  


 


Glucose    100 H  (74-99)  mg/dL


 


Plasma Lactic Acid Kaden     (0.7-2.0)  mmol/L


 


Calcium    10.0  (8.4-10.2)  mg/dL


 


Total Bilirubin    0.3  (0.2-1.3)  mg/dL


 


AST    23  (17-59)  U/L


 


ALT    29  (4-49)  U/L


 


Alkaline Phosphatase    59  ()  U/L


 


Troponin I     (0.000-0.034)  ng/mL


 


NT-Pro-B Natriuret Pep     pg/mL


 


Total Protein    6.7  (6.3-8.2)  g/dL


 


Albumin    4.2  (3.5-5.0)  g/dL


 


Coronavirus (PCR)     (Not Detectd)  














  08/18/21 08/18/21 08/18/21 Range/Units





  16:33 16:33 16:33 


 


WBC     (3.8-10.6)  k/uL


 


RBC     (4.30-5.90)  m/uL


 


Hgb     (13.0-17.5)  gm/dL


 


Hct     (39.0-53.0)  %


 


MCV     (80.0-100.0)  fL


 


MCH     (25.0-35.0)  pg


 


MCHC     (31.0-37.0)  g/dL


 


RDW     (11.5-15.5)  %


 


Plt Count     (150-450)  k/uL


 


MPV     


 


Neutrophils %     %


 


Lymphocytes %     %


 


Monocytes %     %


 


Eosinophils %     %


 


Basophils %     %


 


Neutrophils #     (1.3-7.7)  k/uL


 


Lymphocytes #     (1.0-4.8)  k/uL


 


Monocytes #     (0-1.0)  k/uL


 


Eosinophils #     (0-0.7)  k/uL


 


Basophils #     (0-0.2)  k/uL


 


PT     (9.0-12.0)  sec


 


INR     (<1.2)  


 


APTT     (22.0-30.0)  sec


 


Sodium     (137-145)  mmol/L


 


Potassium     (3.5-5.1)  mmol/L


 


Chloride     ()  mmol/L


 


Carbon Dioxide     (22-30)  mmol/L


 


Anion Gap     mmol/L


 


BUN     (9-20)  mg/dL


 


Creatinine     (0.66-1.25)  mg/dL


 


Est GFR (CKD-EPI)AfAm     (>60 ml/min/1.73 sqM)  


 


Est GFR (CKD-EPI)NonAf     (>60 ml/min/1.73 sqM)  


 


Glucose     (74-99)  mg/dL


 


Plasma Lactic Acid Kaden  1.3    (0.7-2.0)  mmol/L


 


Calcium     (8.4-10.2)  mg/dL


 


Total Bilirubin     (0.2-1.3)  mg/dL


 


AST     (17-59)  U/L


 


ALT     (4-49)  U/L


 


Alkaline Phosphatase     ()  U/L


 


Troponin I   <0.012   (0.000-0.034)  ng/mL


 


NT-Pro-B Natriuret Pep    <11  pg/mL


 


Total Protein     (6.3-8.2)  g/dL


 


Albumin     (3.5-5.0)  g/dL


 


Coronavirus (PCR)     (Not Detectd)  














  08/18/21 Range/Units





  16:37 


 


WBC   (3.8-10.6)  k/uL


 


RBC   (4.30-5.90)  m/uL


 


Hgb   (13.0-17.5)  gm/dL


 


Hct   (39.0-53.0)  %


 


MCV   (80.0-100.0)  fL


 


MCH   (25.0-35.0)  pg


 


MCHC   (31.0-37.0)  g/dL


 


RDW   (11.5-15.5)  %


 


Plt Count   (150-450)  k/uL


 


MPV   


 


Neutrophils %   %


 


Lymphocytes %   %


 


Monocytes %   %


 


Eosinophils %   %


 


Basophils %   %


 


Neutrophils #   (1.3-7.7)  k/uL


 


Lymphocytes #   (1.0-4.8)  k/uL


 


Monocytes #   (0-1.0)  k/uL


 


Eosinophils #   (0-0.7)  k/uL


 


Basophils #   (0-0.2)  k/uL


 


PT   (9.0-12.0)  sec


 


INR   (<1.2)  


 


APTT   (22.0-30.0)  sec


 


Sodium   (137-145)  mmol/L


 


Potassium   (3.5-5.1)  mmol/L


 


Chloride   ()  mmol/L


 


Carbon Dioxide   (22-30)  mmol/L


 


Anion Gap   mmol/L


 


BUN   (9-20)  mg/dL


 


Creatinine   (0.66-1.25)  mg/dL


 


Est GFR (CKD-EPI)AfAm   (>60 ml/min/1.73 sqM)  


 


Est GFR (CKD-EPI)NonAf   (>60 ml/min/1.73 sqM)  


 


Glucose   (74-99)  mg/dL


 


Plasma Lactic Acid Kaden   (0.7-2.0)  mmol/L


 


Calcium   (8.4-10.2)  mg/dL


 


Total Bilirubin   (0.2-1.3)  mg/dL


 


AST   (17-59)  U/L


 


ALT   (4-49)  U/L


 


Alkaline Phosphatase   ()  U/L


 


Troponin I   (0.000-0.034)  ng/mL


 


NT-Pro-B Natriuret Pep   pg/mL


 


Total Protein   (6.3-8.2)  g/dL


 


Albumin   (3.5-5.0)  g/dL


 


Coronavirus (PCR)  Not Detected  (Not Detectd)  














- EKG Data


-: EKG Interpreted by Me


EKG Comments: 


EKG was obtained due to complaint of shortness breath, EKG was obtained at 1504 

EKG with a rate of 76 rhythm is sinus there is normal axis, normal intervals, MO

 196, QRS 90,  there are no acute ST elevations or evidence of acute 

infarction.








Disposition


Clinical Impression: 


 Pneumonia





Disposition: HOME SELF-CARE


Condition: Stable


Prescriptions: 


Azithromycin 250 mg PO DAILY 5 Days #5 tab


Is patient prescribed a controlled substance at d/c from ED?: No


Referrals: 


Shyanne John MD [Primary Care Provider] - 1-2 days

## 2021-11-22 ENCOUNTER — HOSPITAL ENCOUNTER (OUTPATIENT)
Dept: HOSPITAL 47 - CATHCVL | Age: 51
Discharge: HOME | End: 2021-11-22
Attending: INTERNAL MEDICINE
Payer: COMMERCIAL

## 2021-11-22 VITALS — SYSTOLIC BLOOD PRESSURE: 112 MMHG | DIASTOLIC BLOOD PRESSURE: 68 MMHG | HEART RATE: 64 BPM

## 2021-11-22 VITALS — RESPIRATION RATE: 16 BRPM

## 2021-11-22 DIAGNOSIS — Z20.822: ICD-10-CM

## 2021-11-22 DIAGNOSIS — F17.210: ICD-10-CM

## 2021-11-22 DIAGNOSIS — I10: ICD-10-CM

## 2021-11-22 DIAGNOSIS — Z79.899: ICD-10-CM

## 2021-11-22 DIAGNOSIS — Z88.0: ICD-10-CM

## 2021-11-22 DIAGNOSIS — R07.9: Primary | ICD-10-CM

## 2021-11-22 DIAGNOSIS — Z79.82: ICD-10-CM

## 2021-11-22 LAB
ANION GAP SERPL CALC-SCNC: 9 MMOL/L
BASOPHILS # BLD AUTO: 0.1 K/UL (ref 0–0.2)
BASOPHILS NFR BLD AUTO: 1 %
BUN SERPL-SCNC: 14 MG/DL (ref 9–20)
CALCIUM SPEC-MCNC: 9.5 MG/DL (ref 8.4–10.2)
CHLORIDE SERPL-SCNC: 106 MMOL/L (ref 98–107)
CO2 SERPL-SCNC: 24 MMOL/L (ref 22–30)
EOSINOPHIL # BLD AUTO: 0.3 K/UL (ref 0–0.7)
EOSINOPHIL NFR BLD AUTO: 4 %
ERYTHROCYTE [DISTWIDTH] IN BLOOD BY AUTOMATED COUNT: 5.16 M/UL (ref 4.3–5.9)
ERYTHROCYTE [DISTWIDTH] IN BLOOD: 13.3 % (ref 11.5–15.5)
GLUCOSE SERPL-MCNC: 120 MG/DL (ref 74–99)
HCT VFR BLD AUTO: 46 % (ref 39–53)
HGB BLD-MCNC: 15.6 GM/DL (ref 13–17.5)
LYMPHOCYTES # SPEC AUTO: 2.1 K/UL (ref 1–4.8)
LYMPHOCYTES NFR SPEC AUTO: 29 %
MCH RBC QN AUTO: 30.2 PG (ref 25–35)
MCHC RBC AUTO-ENTMCNC: 33.9 G/DL (ref 31–37)
MCV RBC AUTO: 89.1 FL (ref 80–100)
MONOCYTES # BLD AUTO: 0.4 K/UL (ref 0–1)
MONOCYTES NFR BLD AUTO: 6 %
NEUTROPHILS # BLD AUTO: 4.2 K/UL (ref 1.3–7.7)
NEUTROPHILS NFR BLD AUTO: 58 %
PLATELET # BLD AUTO: 289 K/UL (ref 150–450)
POTASSIUM SERPL-SCNC: 4.5 MMOL/L (ref 3.5–5.1)
SODIUM SERPL-SCNC: 139 MMOL/L (ref 137–145)
WBC # BLD AUTO: 7.2 K/UL (ref 3.8–10.6)

## 2021-11-22 PROCEDURE — 80048 BASIC METABOLIC PNL TOTAL CA: CPT

## 2021-11-22 PROCEDURE — 87635 SARS-COV-2 COVID-19 AMP PRB: CPT

## 2021-11-22 PROCEDURE — 93458 L HRT ARTERY/VENTRICLE ANGIO: CPT

## 2021-11-22 PROCEDURE — 85025 COMPLETE CBC W/AUTO DIFF WBC: CPT

## 2021-11-22 NOTE — CC
CARDIAC CATHETERIZATION REPORT



DATE OF SERVICE:

11/22/2021



Mr. Elmore is a 51-year-old male with no prior documented history of coronary

artery disease who has been complaining of progressive episodes of chest discomfort and

dyspnea on exertion.  His EKG showed T-wave inversion laterally. In view of that,

recommendation was made regarding cardiac catheterization.  The procedure as well as

risks and complications were discussed with the patient, who was in full understanding

and agreement.



PROCEDURE DESCRIPTION:

Patient was brought to the cath lab in a fasting, semi-sedated state after receiving

fentanyl and Benadryl and achieving a moderate conscious sedated state.  Using

Xylocaine anesthesia and Seldinger technique, a 6-Danish sheath was introduced in the

right radial artery.  Selective right and left angiography was performed using 5-Danish

3.5 bend right and left Haley catheters. Multiple views were taken of the arteries,

including hemiaxial views. Following that, the 5-Danish right Haley was used to cross

the aortic valve, and left ventricular end-diastolic pressure was calculated. Following

that, catheter and sheaths were removed. Hemostasis was obtained with deployment of a

TR band.  There was no immediate complication. Patient was returned to his room in

stable condition.  Of note, patient received 5000 units of intravenous heparin as well

as intra-arterial verapamil.



FINDINGS:

LEFT MAIN: This is a short-sized vessel bifurcating into left circumflex and left

anterior descending artery. Left main coronary artery has no evidence of high-grade

stenosis.



LEFT ANTERIOR DESCENDING ARTERY: This is a large-sized vessel reaching toward the apex

with a wrap around the apex segment giving rise to two diagonal branches. The left

anterior descending artery as well as its branches have no evidence of obstructive

coronary artery disease.



LEFT CIRCUMFLEX: This is a large nondominant vessel giving rise to a large obtuse

marginal branch.  The left circumflex as well as its branches have no evidence of

obstructive coronary artery disease.



RIGHT CORONARY ARTERY:  This is a large dominant vessel bifurcating into PDA and

posterolateral segment and branches. The right coronary artery as well as its branches

have no evidence of obstructive coronary artery disease.



LEFT VENTRICULOGRAM: Left ventriculogram was not performed.



HEMODYNAMICS: There was no gradient across the aortic valve. The left ventricular end-

diastolic pressure is 12-14 mmHg.



CONCLUSION:

1. Normal coronary arteries.

2. Right dominance.



RECOMMENDATIONS:

In view of findings and anatomy, I have recommended continued medical therapy with the

aggressive coronary risk modifications that have been initiated.  Those findings and

recommendations were discussed with the patient and his family, and they are in full

understanding and agreement.



Duration of sedation was 11 minutes.





SERINA / SHRUTHIN: [id number] / Job#: [id number]

## 2021-11-22 NOTE — LTR
November 22, 2021



To: Dr. John



Re: Sukhi Elmore ([birth year])



Dear Dr. John,



I had the pleasure of performing cardiac catheterization on Mr. Elmore at [facility] on November 22, and a full copy of the procedure will be forwarded to

you. In brief, he was found to have no evidence of significant obstructive coronary

artery disease, with a preserved left ventricular size and systolic function. Based on

those findings, I have recommended continued medical therapy with the aggressive

coronary risk modifications you have initiated.



Thank you again for allowing me to participate in this patient's care. Please feel free

to call with any questions.



Sincerely,







Oscar Rubi M.D.





SERINA / RONALDO: [id number] / Job#: [id number]

## 2022-10-23 ENCOUNTER — HOSPITAL ENCOUNTER (EMERGENCY)
Dept: HOSPITAL 47 - EC | Age: 52
Discharge: HOME | End: 2022-10-23
Payer: COMMERCIAL

## 2022-10-23 VITALS — SYSTOLIC BLOOD PRESSURE: 145 MMHG | HEART RATE: 86 BPM | DIASTOLIC BLOOD PRESSURE: 69 MMHG | RESPIRATION RATE: 16 BRPM

## 2022-10-23 VITALS — TEMPERATURE: 98.1 F

## 2022-10-23 DIAGNOSIS — Z88.0: ICD-10-CM

## 2022-10-23 DIAGNOSIS — Z87.891: ICD-10-CM

## 2022-10-23 DIAGNOSIS — Z79.899: ICD-10-CM

## 2022-10-23 DIAGNOSIS — I10: ICD-10-CM

## 2022-10-23 DIAGNOSIS — S39.012A: Primary | ICD-10-CM
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
## 2022-10-23 PROCEDURE — 72100 X-RAY EXAM L-S SPINE 2/3 VWS: CPT

## 2022-10-23 PROCEDURE — 99283 EMERGENCY DEPT VISIT LOW MDM: CPT

## 2022-10-23 PROCEDURE — 96372 THER/PROPH/DIAG INJ SC/IM: CPT

## 2022-10-23 NOTE — XR
EXAMINATION TYPE: XR lumbar spine 2 or 3V

 

DATE OF EXAM: 10/23/2022

 

COMPARISON: NONE

 

HISTORY: Back pain

 

TECHNIQUE: 3 views

 

FINDINGS: Lumbar vertebrae have normal alignment. Posterior elements are intact. There is mild narrow
ing at L5-S1 disc. Sacroiliac joints are intact.

 

IMPRESSION: Negative lumbar spine exam. No fracture. Minor degenerative disc change at L5-S1.

## 2022-10-23 NOTE — ED
Back Pain HPI





- General


Chief Complaint: Back Pain/Injury


Stated Complaint: Back pain


Time Seen by Provider: 10/23/22 18:35


Source: patient


Limitations: no limitations





- History of Present Illness


Initial Comments: 


Patient is a 52-year-old male presenting with chief complaint of back pain.  

Patient states that yesterday while doing yard work he bent to pick something up

and he felt a hole in his lower back, followed by shooting pain down both legs. 

Patient states that the pain is no longer shooting down his legs, however he is 

continuing to have lower back pain.  He has been taking ibuprofen with little 

relief.  No loss of bowel or bladder control or saddle paresthesia.  No fever or

chills.








- Related Data


                                Home Medications











 Medication  Instructions  Recorded  Confirmed


 


Losartan [Cozaar] 50 mg PO HS 11/18/21 10/23/22


 


Metoprolol Succinate [Toprol XL] 50 mg PO HS 11/18/21 10/23/22








                                  Previous Rx's











 Medication  Instructions  Recorded


 


Cyclobenzaprine [Flexeril] 10 mg PO TID PRN #15 tab 10/23/22


 


methylPREDNISolone Dose Pack 4 mg PO AS DIRECTED #1 packet 10/23/22





[Medrol Dose Pack]  











                                    Allergies











Allergy/AdvReac Type Severity Reaction Status Date / Time


 


Penicillins Allergy  Swelling Verified 10/23/22 17:49














Review of Systems


ROS Statement: 


Those systems with pertinent positive or pertinent negative responses have been 

documented in the HPI.





ROS Other: All systems not noted in ROS Statement are negative.





Past Medical History


Past Medical History: Hypertension


Additional Past Medical History / Comment(s): SEE CARDIOLOGY HISTORY BY DR. LUNA.  diverticulitis with abscess. COVID 2021.


History of Any Multi-Drug Resistant Organisms: None Reported


Past Surgical History: Bowel Resection, Cholecystectomy


Additional Past Surgical History / Comment(s): BOWEL RESECTION.  vasectomy


Past Anesthesia/Blood Transfusion Reactions: Previous Problems w/ Anesthesia


Additional Past Anesthesia/Blood Transfusion Reaction / Comment(s): difficult 

intubation with cholecystectomy intubated through nose


Past Psychological History: No Psychological Hx Reported


Smoking Status: Former smoker


Past Alcohol Use History: Occasional


Past Drug Use History: None Reported





- Past Family History


  ** Father


Family Medical History: Hypertension


Additional Family Medical History / Comment(s): heart issues with blood vessels.





  ** Mother


Family Medical History: No Reported History


Additional Family Medical History / Comment(s): colitis





General Exam


Limitations: no limitations


General appearance: alert, in no apparent distress


Head exam: Present: atraumatic, normocephalic, normal inspection


Eye exam: Present: normal appearance, PERRL, EOMI.  Absent: scleral icterus, 

conjunctival injection, periorbital swelling


Neck exam: Present: normal inspection


Extremities exam: Present: normal inspection


Back exam: Present: tenderness


Neurological exam: Present: alert, oriented X3, CN II-XII intact


Psychiatric exam: Present: normal affect, normal mood


Skin exam: Present: warm, dry, intact, normal color.  Absent: rash





Course


                                   Vital Signs











  10/23/22 10/23/22





  17:47 19:49


 


Temperature 98.1 F 


 


Pulse Rate 80 86


 


Respiratory 20 16





Rate  


 


Blood Pressure 123/82 145/69


 


O2 Sat by Pulse 99 99





Oximetry  














Medical Decision Making





- Medical Decision Making


Patient is a 52-year-old male presenting with chief complaint of lower back 

pain.  Patient injured his lower back yesterday while doing yard work, when he 

was bending down to grab something he felt sudden onset of pain with radiation 

down the bilateral legs.  No red flag symptoms.  On examination there is 

tenderness to palpation of the lower back.  Patient is given pain medication.  

X-ray shows no acute process, there is evidence of degenerative disc disease.  

Patient is educated on supportive treatment, provided with prescription for 

cyclobenzaprine.Follow-up with PCP.  Report back to ER with any new or worsening

symptoms.  Discussed return parameters and answered all questions.  Patient 

conveyed verbal understanding and agreed to the plan.  I discussed this case in 

detail with my attending Dr. Ferreira








Disposition


Clinical Impression: 


 Mechanical back pain, Strain of lumbar region





Disposition: HOME SELF-CARE


Condition: Good


Instructions (If sedation given, give patient instructions):  Low Back Strain 

(ED), Acute Low Back Pain (ED), Lumbar Radiculopathy (ED), Lower Back Exercises 

(ED)


Additional Instructions: 


Follow-up with PCP.  Report back to ER with any new or worsening symptoms.  Take

medication as prescribed.


Prescriptions: 


Cyclobenzaprine [Flexeril] 10 mg PO TID PRN #15 tab


 PRN Reason: Spasms


methylPREDNISolone Dose Pack [Medrol Dose Pack] 4 mg PO AS DIRECTED #1 packet


Is patient prescribed a controlled substance at d/c from ED?: No


Referrals: 


None,Stated [Primary Care Provider] - 1-2 days


Pasia,E Tye, DO [Doctor of Osteopathic Medicine] - 1-2 days


Time of Disposition: 21:36

## 2024-01-22 NOTE — XR
EXAMINATION TYPE: XR chest 2V

 

DATE OF EXAM: 1/29/2018

 

COMPARISON: NONE

 

HISTORY: Dizziness

 

TECHNIQUE:  Frontal and lateral views of the chest are obtained.

 

FINDINGS:  There is no heart failure nor confluent pneumonic infiltrate. Costophrenic angles are ethel
r. There are chest leads. Bony thorax is intact.

 

IMPRESSION:  No active cardiopulmonary disease. Normal heart. normal (ped)...

## 2024-08-31 ENCOUNTER — HOSPITAL ENCOUNTER (EMERGENCY)
Dept: HOSPITAL 47 - EC | Age: 54
LOS: 1 days | Discharge: HOME | End: 2024-09-01
Payer: COMMERCIAL

## 2024-08-31 VITALS — TEMPERATURE: 98 F

## 2024-08-31 DIAGNOSIS — Z87.891: ICD-10-CM

## 2024-08-31 DIAGNOSIS — Z88.0: ICD-10-CM

## 2024-08-31 DIAGNOSIS — Z90.49: ICD-10-CM

## 2024-08-31 DIAGNOSIS — Z86.16: ICD-10-CM

## 2024-08-31 DIAGNOSIS — K52.9: Primary | ICD-10-CM

## 2024-08-31 PROCEDURE — 96374 THER/PROPH/DIAG INJ IV PUSH: CPT

## 2024-08-31 PROCEDURE — 81003 URINALYSIS AUTO W/O SCOPE: CPT

## 2024-08-31 PROCEDURE — 96361 HYDRATE IV INFUSION ADD-ON: CPT

## 2024-08-31 PROCEDURE — 96375 TX/PRO/DX INJ NEW DRUG ADDON: CPT

## 2024-08-31 PROCEDURE — 85025 COMPLETE CBC W/AUTO DIFF WBC: CPT

## 2024-08-31 PROCEDURE — 36415 COLL VENOUS BLD VENIPUNCTURE: CPT

## 2024-08-31 PROCEDURE — 82150 ASSAY OF AMYLASE: CPT

## 2024-08-31 PROCEDURE — 99284 EMERGENCY DEPT VISIT MOD MDM: CPT

## 2024-08-31 PROCEDURE — 83605 ASSAY OF LACTIC ACID: CPT

## 2024-08-31 PROCEDURE — 83690 ASSAY OF LIPASE: CPT

## 2024-08-31 PROCEDURE — 74177 CT ABD & PELVIS W/CONTRAST: CPT

## 2024-08-31 PROCEDURE — 80053 COMPREHEN METABOLIC PANEL: CPT

## 2024-08-31 RX ADMIN — KETOROLAC TROMETHAMINE STA MG: 15 INJECTION, SOLUTION INTRAMUSCULAR; INTRAVENOUS at 23:52

## 2024-08-31 RX ADMIN — MORPHINE SULFATE STA MG: 4 INJECTION, SOLUTION INTRAMUSCULAR; INTRAVENOUS at 23:53

## 2024-08-31 RX ADMIN — CEFAZOLIN STA MLS/HR: 330 INJECTION, POWDER, FOR SOLUTION INTRAMUSCULAR; INTRAVENOUS at 23:51

## 2024-09-01 VITALS — HEART RATE: 66 BPM | DIASTOLIC BLOOD PRESSURE: 90 MMHG | SYSTOLIC BLOOD PRESSURE: 145 MMHG

## 2024-09-01 VITALS — RESPIRATION RATE: 16 BRPM

## 2024-09-01 LAB
ALBUMIN SERPL-MCNC: 4.7 G/DL (ref 3.5–5)
ALP SERPL-CCNC: 52 U/L (ref 38–126)
ALT SERPL-CCNC: 51 U/L (ref 4–49)
AMYLASE SERPL-CCNC: 52 U/L (ref 30–110)
ANION GAP SERPL CALC-SCNC: 9 MMOL/L
AST SERPL-CCNC: 40 U/L (ref 17–59)
BASOPHILS # BLD AUTO: 0.1 K/UL (ref 0–0.2)
BASOPHILS NFR BLD AUTO: 1 %
BUN SERPL-SCNC: 17 MG/DL (ref 9–20)
CALCIUM SPEC-MCNC: 9.9 MG/DL (ref 8.4–10.2)
CHLORIDE SERPL-SCNC: 105 MMOL/L (ref 98–107)
CO2 SERPL-SCNC: 25 MMOL/L (ref 22–30)
EOSINOPHIL # BLD AUTO: 0.2 K/UL (ref 0–0.7)
EOSINOPHIL NFR BLD AUTO: 2 %
ERYTHROCYTE [DISTWIDTH] IN BLOOD BY AUTOMATED COUNT: 5.38 M/UL (ref 4.3–5.9)
ERYTHROCYTE [DISTWIDTH] IN BLOOD: 13.5 % (ref 11.5–15.5)
GLUCOSE SERPL-MCNC: 80 MG/DL (ref 74–99)
HCT VFR BLD AUTO: 47.4 % (ref 39–53)
HGB BLD-MCNC: 16.4 GM/DL (ref 13–17.5)
LIPASE SERPL-CCNC: 76 U/L (ref 23–300)
LYMPHOCYTES # SPEC AUTO: 2.7 K/UL (ref 1–4.8)
LYMPHOCYTES NFR SPEC AUTO: 31 %
MCH RBC QN AUTO: 30.4 PG (ref 25–35)
MCHC RBC AUTO-ENTMCNC: 34.5 G/DL (ref 31–37)
MCV RBC AUTO: 88.2 FL (ref 80–100)
MONOCYTES # BLD AUTO: 0.6 K/UL (ref 0–1)
MONOCYTES NFR BLD AUTO: 6 %
NEUTROPHILS # BLD AUTO: 5.3 K/UL (ref 1.3–7.7)
NEUTROPHILS NFR BLD AUTO: 59 %
PH UR: 7 [PH] (ref 5–8)
PLATELET # BLD AUTO: 289 K/UL (ref 150–450)
POTASSIUM SERPL-SCNC: 3.8 MMOL/L (ref 3.5–5.1)
PROT SERPL-MCNC: 7.5 G/DL (ref 6.3–8.2)
SODIUM SERPL-SCNC: 139 MMOL/L (ref 137–145)
SP GR UR: 1.04 (ref 1–1.03)
UROBILINOGEN UR QL STRIP: <2 MG/DL (ref ?–2)
WBC # BLD AUTO: 8.9 K/UL (ref 3.8–10.6)

## 2024-09-01 NOTE — ED
Abdominal Pain HPI





- General


Chief Complaint: Abdominal Pain


Stated Complaint: right sided abdominal pain


Time Seen by Provider: 08/31/24 23:22


Source: patient


Mode of arrival: ambulatory


Limitations: no limitations





- History of Present Illness


Initial Comments: 


54-year-old male presenting with chief complaint of abdominal pain.  Pain 

started several days ago.  Primarily located on the right side but does have 

some discomfort in the lower abdomen as well.  Pain is colicky in nature and has

been getting worse.  He also admits to diarrhea.  No vomiting.  He does have 

history of diverticulitis and states that this feels similar.  No fevers.  No 

dysuria or hematuria.








- Related Data


                                Home Medications











 Medication  Instructions  Recorded  Confirmed


 


Losartan [Cozaar] 50 mg PO HS 11/18/21 10/23/22


 


Metoprolol Succinate [Toprol XL] 50 mg PO HS 11/18/21 10/23/22








                                  Previous Rx's











 Medication  Instructions  Recorded


 


Cyclobenzaprine [Flexeril] 10 mg PO TID PRN #15 tab 10/23/22


 


methylPREDNISolone Dose Pack 4 mg PO AS DIRECTED #1 packet 10/23/22





[Medrol Dose Pack]  


 


Ciprofloxacin HCl [Cipro] 500 mg PO Q12HR 7 Days #14 tab 09/01/24


 


metroNIDAZOLE [Flagyl] 500 mg PO BID 7 Days #14 tab 09/01/24











                                    Allergies











Allergy/AdvReac Type Severity Reaction Status Date / Time


 


Penicillins Allergy  Swelling Verified 08/31/24 23:18














Review of Systems


ROS Statement: 


Those systems with pertinent positive or pertinent negative responses have been 

documented in the HPI.





ROS Other: All systems not noted in ROS Statement are negative.





Past Medical History


Past Medical History: Hypertension


Additional Past Medical History / Comment(s): SEE CARDIOLOGY HISTORY BY DR. LUNA.  diverticulitis with abscess. COVID 2021.


History of Any Multi-Drug Resistant Organisms: None Reported


Past Surgical History: Bowel Resection, Cholecystectomy


Additional Past Surgical History / Comment(s): BOWEL RESECTION.  vasectomy


Past Anesthesia/Blood Transfusion Reactions: Previous Problems w/ Anesthesia


Additional Past Anesthesia/Blood Transfusion Reaction / Comment(s): difficult 

intubation with cholecystectomy intubated through nose


Past Psychological History: No Psychological Hx Reported


Smoking Status: Former smoker


Past Alcohol Use History: Occasional


Past Drug Use History: None Reported





- Past Family History


  ** Father


Family Medical History: Hypertension


Additional Family Medical History / Comment(s): heart issues with blood vessels.





  ** Mother


Family Medical History: No Reported History


Additional Family Medical History / Comment(s): colitis





General Exam


Limitations: no limitations


General appearance: alert, in no apparent distress


Head exam: Present: atraumatic, normocephalic


Eye exam: Present: normal appearance, EOMI


Neck exam: Present: normal inspection.  Absent: meningismus


Respiratory exam: Present: normal lung sounds bilaterally.  Absent: respiratory 

distress, wheezes, rales, rhonchi, stridor


Cardiovascular Exam: Present: regular rate, normal rhythm, normal heart sounds. 

 Absent: systolic murmur, diastolic murmur, rubs, gallop, clicks


GI/Abdominal exam: Present: soft, tenderness, guarding.  Absent: distended, 

rebound, rigid


Neurological exam: Present: alert, oriented X3


Psychiatric exam: Present: normal affect, normal mood


Skin exam: Present: warm, dry





Course


                                   Vital Signs











  08/31/24 09/01/24 09/01/24





  23:14 01:55 03:14


 


Temperature 98.0 F  


 


Pulse Rate 74 74 80


 


Respiratory 17 16 16





Rate   


 


Blood Pressure 176/105 147/88 151/94


 


O2 Sat by Pulse 97 96 98





Oximetry   














  09/01/24





  05:13


 


Temperature 


 


Pulse Rate 66


 


Respiratory 16





Rate 


 


Blood Pressure 145/90


 


O2 Sat by Pulse 97





Oximetry 














Medical Decision Making





- Medical Decision Making


Was pt. sent in by a medical professional or institution ([facility], NP, urgent 

care, hospital, or nursing home...) When possible be specific


@  -No


Did you speak to anyone other than the patient for history (EMS, parent, family,

 police, friend...)? What history was obtained from this source 


@  -No


Did you review nursing and triage notes (agree or disagree)?  Why? 


@  -I reviewed and agree with nursing and triage notes


Were old charts reviewed (outside hosp., previous admission, EMS record, old EK

G, old radiological studies, urgent care reports/EKG's, nursing home records)? 

Report findings 


@  -No old charts were reviewed


Differential Diagnosis (chest pain, altered mental status, abdominal pain women,

 abdominal pain men, vaginal bleeding, weakness, fever, dyspnea, syncope, 

headache, dizziness, GI bleed, back pain, seizure, CVA, palpatations, mental 

health, musculoskeletal)? 


@  -[facility] Differential Abdominal Pain Men:


Appendicitis, cholecystitis, diverticulosis, ischemic bowel, pancreatitis, 

hepatitis, UTI, gastroenteritis, AAA, incarcerated hernia, bowel obstruction, 

constipation, inflammatory bowel, hepatitis, peptic ulcer disease, splenic 

infarction, perforated viscus, testicular torsion... This is not meant to be an 

all-inclusive list





EKG interpreted by me (3pts min.).


@  -As above


X-rays interpreted by me (1pt min.).


@  -None done


CT interpreted by me (1pt min.).


@  -CT shows possible uncomplicated acute colitis versus product of poor 

distention


U/S interpreted by me (1pt. min.).


@  -None done


What testing was considered but not performed or refused? (CT, X-rays, U/S, labs

)? Why?


@  -None


What meds were considered but not given or refused? Why?


@  -None


Did you discuss the management of the patient with other professionals 

(professionals i.e. , PA, NP, lab, RT, psych nurse, , , 

teacher, , )? Give summary


@  -No


Was smoking cessation discussed for >3mins.?


@  -No


Was critical care preformed (if so, how long)?


@  -No


Were there social determinants of health that impacted care today? How? 

(Homelessness, low income, unemployed, alcoholism, drug addiction, 

transportation, low edu. Level, literacy, decrease access to med. care, long-term, 

rehab)?


@  -No


Was there de-escalation of care discussed even if they declined (Discuss DNR or 

withdrawal of care, Hospice)? DNR status


@  -No


What co-morbidities impacted this encounter? (DM, HTN, Smoking, COPD, CAD, 

Cancer, CVA, ARF, Chemo, Hep., AIDS, mental health diagnosis, sleep apnea, 

morbid obesity)?


@  -None


Was patient admitted / discharged? Hospital course, mention meds given and 

route, prescriptions, significant lab abnormalities, going to OR and other 

pertinent info.


@  -54-year-old male presenting with chief complaint of right-sided abdominal 

pain.  Admits to diarrhea.  On exam there is tenderness guarding and rebound.  

No leukocytosis or anemia.  Urine shows no infectious process or bleeding.  CT 

shows colitis.  Patient will be treated with metronidazole and Cipro due to 

penicillin allergy.  He is educated on today's findings and treatment plan.  

Discharged home.  Follow-up with PCP.  Report back to ER with any new or 

worsening symptoms.  Discussed return parameters and answered all questions.  

Patient conveyed verbal understanding and agreed to the plan.  I discussed this 

case in detail with my attending 


Undiagnosed new problem with uncertain prognosis?


@  -No


Drug Therapy requiring intensive monitoring for toxicity (Heparin, Nitro, Insuli

n, Cardizem)?


@  -No


Were any procedures done?


@  -No


Diagnosis/symptom?


@  -Colitis


Acute, or Chronic, or Acute on Chronic?


@  -Acute


Uncomplicated (without systemic symptoms) or Complicated (systemic symptoms)?


@  -Uncomplicated


Side effects of treatment?


@  -No


Exacerbation, Progression, or Severe Exacerbation?


@  -No


Poses a threat to life or bodily function? How? (Chest pain, USA, MI, pneumonia,

 PE, COPD, DKA, ARF, appy, cholecystitis, CVA, Diverticulitis, Homicidal, 

Suicidal, threat to staff... and all critical care pts)


@  -No











- Lab Data


Result diagrams: 


                                 08/31/24 23:51





                                 08/31/24 23:51


                                   Lab Results











  08/31/24 08/31/24 08/31/24 Range/Units





  23:51 23:51 23:51 


 


WBC  8.9    (3.8-10.6)  k/uL


 


RBC  5.38    (4.30-5.90)  m/uL


 


Hgb  16.4    (13.0-17.5)  gm/dL


 


Hct  47.4    (39.0-53.0)  %


 


MCV  88.2    (80.0-100.0)  fL


 


MCH  30.4    (25.0-35.0)  pg


 


MCHC  34.5    (31.0-37.0)  g/dL


 


RDW  13.5    (11.5-15.5)  %


 


Plt Count  289    (150-450)  k/uL


 


MPV  8.1    


 


Neutrophils %  59    %


 


Lymphocytes %  31    %


 


Monocytes %  6    %


 


Eosinophils %  2    %


 


Basophils %  1    %


 


Neutrophils #  5.3    (1.3-7.7)  k/uL


 


Lymphocytes #  2.7    (1.0-4.8)  k/uL


 


Monocytes #  0.6    (0-1.0)  k/uL


 


Eosinophils #  0.2    (0-0.7)  k/uL


 


Basophils #  0.1    (0-0.2)  k/uL


 


Sodium   139   (137-145)  mmol/L


 


Potassium   3.8   (3.5-5.1)  mmol/L


 


Chloride   105   ()  mmol/L


 


Carbon Dioxide   25   (22-30)  mmol/L


 


Anion Gap   9   mmol/L


 


BUN   17   (9-20)  mg/dL


 


Creatinine   0.99   (0.66-1.25)  mg/dL


 


Est GFR (CKD-EPI)AfAm   >90   (>60 ml/min/1.73 sqM)  


 


Est GFR (CKD-EPI)NonAf   85   (>60 ml/min/1.73 sqM)  


 


Glucose   80   (74-99)  mg/dL


 


Plasma Lactic Acid Kaden    1.3  (0.7-2.0)  mmol/L


 


Calcium   9.9   (8.4-10.2)  mg/dL


 


Total Bilirubin   0.9   (0.2-1.3)  mg/dL


 


AST   40   (17-59)  U/L


 


ALT   51 H   (4-49)  U/L


 


Alkaline Phosphatase   52   ()  U/L


 


Total Protein   7.5   (6.3-8.2)  g/dL


 


Albumin   4.7   (3.5-5.0)  g/dL


 


Amylase   52   ()  U/L


 


Lipase   76   ()  U/L


 


Urine Color     


 


Urine Appearance     (Clear)  


 


Urine pH     (5.0-8.0)  


 


Ur Specific Gravity     (1.001-1.035)  


 


Urine Protein     (Negative)  


 


Urine Glucose (UA)     (Negative)  


 


Urine Ketones     (Negative)  


 


Urine Blood     (Negative)  


 


Urine Nitrite     (Negative)  


 


Urine Bilirubin     (Negative)  


 


Urine Urobilinogen     (<2.0)  mg/dL


 


Ur Leukocyte Esterase     (Negative)  














  09/01/24 Range/Units





  01:50 


 


WBC   (3.8-10.6)  k/uL


 


RBC   (4.30-5.90)  m/uL


 


Hgb   (13.0-17.5)  gm/dL


 


Hct   (39.0-53.0)  %


 


MCV   (80.0-100.0)  fL


 


MCH   (25.0-35.0)  pg


 


MCHC   (31.0-37.0)  g/dL


 


RDW   (11.5-15.5)  %


 


Plt Count   (150-450)  k/uL


 


MPV   


 


Neutrophils %   %


 


Lymphocytes %   %


 


Monocytes %   %


 


Eosinophils %   %


 


Basophils %   %


 


Neutrophils #   (1.3-7.7)  k/uL


 


Lymphocytes #   (1.0-4.8)  k/uL


 


Monocytes #   (0-1.0)  k/uL


 


Eosinophils #   (0-0.7)  k/uL


 


Basophils #   (0-0.2)  k/uL


 


Sodium   (137-145)  mmol/L


 


Potassium   (3.5-5.1)  mmol/L


 


Chloride   ()  mmol/L


 


Carbon Dioxide   (22-30)  mmol/L


 


Anion Gap   mmol/L


 


BUN   (9-20)  mg/dL


 


Creatinine   (0.66-1.25)  mg/dL


 


Est GFR (CKD-EPI)AfAm   (>60 ml/min/1.73 sqM)  


 


Est GFR (CKD-EPI)NonAf   (>60 ml/min/1.73 sqM)  


 


Glucose   (74-99)  mg/dL


 


Plasma Lactic Acid Kaden   (0.7-2.0)  mmol/L


 


Calcium   (8.4-10.2)  mg/dL


 


Total Bilirubin   (0.2-1.3)  mg/dL


 


AST   (17-59)  U/L


 


ALT   (4-49)  U/L


 


Alkaline Phosphatase   ()  U/L


 


Total Protein   (6.3-8.2)  g/dL


 


Albumin   (3.5-5.0)  g/dL


 


Amylase   ()  U/L


 


Lipase   ()  U/L


 


Urine Color  Colorless  


 


Urine Appearance  Clear  (Clear)  


 


Urine pH  7.0  (5.0-8.0)  


 


Ur Specific Gravity  1.043 H  (1.001-1.035)  


 


Urine Protein  Negative  (Negative)  


 


Urine Glucose (UA)  Negative  (Negative)  


 


Urine Ketones  Negative  (Negative)  


 


Urine Blood  Negative  (Negative)  


 


Urine Nitrite  Negative  (Negative)  


 


Urine Bilirubin  Negative  (Negative)  


 


Urine Urobilinogen  <2.0  (<2.0)  mg/dL


 


Ur Leukocyte Esterase  Negative  (Negative)  














Disposition


Clinical Impression: 


 Colitis





Disposition: HOME SELF-CARE


Condition: Good


Instructions (If sedation given, give patient instructions):  Colitis (ED)


Additional Instructions: 


Follow-up with PCP.  Report back to ER with any new or worsening symptoms.  Take

 medication as prescribed.


Prescriptions: 


Ciprofloxacin HCl [Cipro] 500 mg PO Q12HR 7 Days #14 tab


metroNIDAZOLE [Flagyl] 500 mg PO BID 7 Days #14 tab


Is patient prescribed a controlled substance at d/c from ED?: No


Referrals: 


None,Stated [Primary Care Provider] - 1-2 days


Marjan Pereira MD [STAFF PHYSICIAN] - 1-2 days


Time of Disposition: 04:55

## 2024-09-01 NOTE — CT
EXAMINATION TYPE: CT abdomen pelvis w con

 

DATE OF EXAM: 9/1/2024

 

COMPARISON: Prior CT June 2, 2020

 

HISTORY: Pt complaining of R sided abdominal pain.  pt states feels similar to Diverticulitis. Pt sta
leonardo he also had had diarrhea.

 

CT DLP: 1124.7 mGycm, Automated Exposure Control for Dose Reduction was Utilized.

 

CONTRAST: 

CT scan of the abdomen and pelvis is performed without oral and with IV Contrast, patient injected wi
th 100 mL of Isovue 300.

 

FINDINGS:

 

LUNG BASES:  No significant abnormality is appreciated.

 

LIVER/GB: Cholecystectomy clips are redemonstrated. Liver remains diffusely low dense consistent with
 diffuse fatty infiltrative hepatocellular disease

 

PANCREAS:  No significant abnormality is seen.

 

SPLEEN:  No significant abnormality is seen.

 

ADRENALS:  No significant abnormality is seen.

 

KIDNEYS:  No significant abnormality is seen.

 

BOWEL: Surgical sutures near the hepatic flexure are now seen. No abnormal small or large bowel dilat
ation. Mild to moderate wall thickening mid transverse colon.

 

PROSTATE/SEMINAL VESICLES: Enlarged prostate consistent with BPH.

 

LYMPH NODES:  No greater than 1cm abdominal or pelvic lymph nodes are appreciated.

 

OSSEOUS STRUCTURES: Moderate disc space narrowing with vacuum disc phenomenon at lumbosacral junction
.

 

OTHER:  No significant additional abnormality is seen.

 

IMPRESSION: Possible uncomplicated acute colitis versus product of poor distention.

## 2024-10-21 ENCOUNTER — HOSPITAL ENCOUNTER (OUTPATIENT)
Dept: HOSPITAL 47 - LABWHC1 | Age: 54
Discharge: HOME | End: 2024-10-21
Attending: INTERNAL MEDICINE
Payer: COMMERCIAL

## 2024-10-21 LAB
ALBUMIN SERPL-MCNC: 4.4 G/DL (ref 3.8–4.9)
ALBUMIN/GLOB SERPL: 1.76 RATIO (ref 1.6–3.17)
ALP SERPL-CCNC: 64 U/L (ref 41–126)
ALT SERPL-CCNC: 19 U/L (ref 10–49)
ANION GAP SERPL CALC-SCNC: 16.2 MMOL/L (ref 4–12)
AST SERPL-CCNC: 13 U/L (ref 14–35)
BUN SERPL-SCNC: 15.7 MG/DL (ref 9–27)
BUN/CREAT SERPL: 15.7 RATIO (ref 12–20)
CALCIUM SPEC-MCNC: 9.8 MG/DL (ref 8.7–10.3)
CHLORIDE SERPL-SCNC: 104 MMOL/L (ref 96–109)
CHOLEST SERPL-MCNC: 194 MG/DL (ref 0–200)
CO2 SERPL-SCNC: 19.8 MMOL/L (ref 21.6–31.8)
GLOBULIN SER CALC-MCNC: 2.5 G/DL (ref 1.6–3.3)
GLUCOSE SERPL-MCNC: 119 MG/DL (ref 70–110)
HDLC SERPL-MCNC: 41.1 MG/DL (ref 40–60)
LDLC SERPL CALC-MCNC: 115.1 MG/DL (ref 0–131)
POTASSIUM SERPL-SCNC: 4.4 MMOL/L (ref 3.5–5.5)
PROT SERPL-MCNC: 6.9 G/DL (ref 6.2–8.2)
SODIUM SERPL-SCNC: 140 MMOL/L (ref 135–145)
TRIGL SERPL-MCNC: 189 MG/DL (ref 0–149)
VLDLC SERPL CALC-MCNC: 37.8 MG/DL (ref 5–40)

## 2024-10-21 PROCEDURE — 80061 LIPID PANEL: CPT

## 2024-10-21 PROCEDURE — 80053 COMPREHEN METABOLIC PANEL: CPT

## 2024-10-21 PROCEDURE — 84439 ASSAY OF FREE THYROXINE: CPT

## 2024-10-21 PROCEDURE — 36415 COLL VENOUS BLD VENIPUNCTURE: CPT

## 2024-10-21 PROCEDURE — 83036 HEMOGLOBIN GLYCOSYLATED A1C: CPT

## 2024-10-21 PROCEDURE — 84443 ASSAY THYROID STIM HORMONE: CPT

## 2025-06-08 ENCOUNTER — HOSPITAL ENCOUNTER (EMERGENCY)
Dept: HOSPITAL 47 - EC | Age: 55
Discharge: HOME | End: 2025-06-08
Payer: COMMERCIAL

## 2025-06-08 VITALS — SYSTOLIC BLOOD PRESSURE: 165 MMHG | TEMPERATURE: 98.6 F | DIASTOLIC BLOOD PRESSURE: 91 MMHG

## 2025-06-08 VITALS — RESPIRATION RATE: 18 BRPM | HEART RATE: 68 BPM

## 2025-06-08 DIAGNOSIS — Z88.0: ICD-10-CM

## 2025-06-08 DIAGNOSIS — Z87.891: ICD-10-CM

## 2025-06-08 DIAGNOSIS — K04.7: Primary | ICD-10-CM

## 2025-06-08 DIAGNOSIS — M54.9: ICD-10-CM

## 2025-06-08 PROCEDURE — 96372 THER/PROPH/DIAG INJ SC/IM: CPT

## 2025-06-08 PROCEDURE — 99283 EMERGENCY DEPT VISIT LOW MDM: CPT

## 2025-06-08 RX ADMIN — HYDROCODONE BITARTRATE AND ACETAMINOPHEN STA EACH: 5; 325 TABLET ORAL at 20:40

## 2025-06-08 RX ADMIN — ACETAMINOPHEN AND CODEINE PHOSPHATE STA EACH: 300; 30 TABLET ORAL at 20:42

## 2025-06-08 RX ADMIN — KETOROLAC TROMETHAMINE STA MG: 15 INJECTION, SOLUTION INTRAMUSCULAR; INTRAVENOUS at 20:43

## 2025-06-08 RX ADMIN — CLINDAMYCIN HYDROCHLORIDE STA MG: 150 CAPSULE ORAL at 20:39

## 2025-06-08 RX ADMIN — LIDOCAINE ONE PATCH: 4 PATCH TOPICAL at 20:46

## 2025-07-03 ENCOUNTER — HOSPITAL ENCOUNTER (OUTPATIENT)
Dept: HOSPITAL 47 - RADUSWWP | Age: 55
Discharge: HOME | End: 2025-07-03
Payer: COMMERCIAL

## 2025-07-03 DIAGNOSIS — I73.9: Primary | ICD-10-CM

## 2025-07-03 DIAGNOSIS — I10: ICD-10-CM

## 2025-07-03 PROCEDURE — 93922 UPR/L XTREMITY ART 2 LEVELS: CPT
